# Patient Record
Sex: FEMALE
[De-identification: names, ages, dates, MRNs, and addresses within clinical notes are randomized per-mention and may not be internally consistent; named-entity substitution may affect disease eponyms.]

---

## 2017-01-16 PROBLEM — M17.11 PRIMARY OSTEOARTHRITIS OF RIGHT KNEE: Status: ACTIVE | Noted: 2017-01-16

## 2017-01-27 ENCOUNTER — CHARTING TRANS (OUTPATIENT)
Dept: OTHER | Age: 57
End: 2017-01-27

## 2017-01-27 ASSESSMENT — PAIN SCALES - GENERAL: PAINLEVEL_OUTOF10: 8

## 2017-02-14 PROCEDURE — 87086 URINE CULTURE/COLONY COUNT: CPT | Performed by: FAMILY MEDICINE

## 2017-02-14 PROCEDURE — 87077 CULTURE AEROBIC IDENTIFY: CPT | Performed by: FAMILY MEDICINE

## 2017-03-11 PROCEDURE — 80061 LIPID PANEL: CPT | Performed by: INTERNAL MEDICINE

## 2017-03-11 PROCEDURE — 84460 ALANINE AMINO (ALT) (SGPT): CPT | Performed by: INTERNAL MEDICINE

## 2017-03-11 PROCEDURE — 84450 TRANSFERASE (AST) (SGOT): CPT | Performed by: INTERNAL MEDICINE

## 2017-03-11 PROCEDURE — 87086 URINE CULTURE/COLONY COUNT: CPT | Performed by: FAMILY MEDICINE

## 2017-03-11 PROCEDURE — 36415 COLL VENOUS BLD VENIPUNCTURE: CPT | Performed by: INTERNAL MEDICINE

## 2017-04-04 PROBLEM — J30.1 SEASONAL ALLERGIC RHINITIS DUE TO POLLEN: Status: ACTIVE | Noted: 2017-04-04

## 2017-04-04 PROBLEM — E78.5 DYSLIPIDEMIA: Status: ACTIVE | Noted: 2017-04-04

## 2018-03-13 PROBLEM — Z87.19 HISTORY OF INGUINAL HERNIA: Status: ACTIVE | Noted: 2018-03-13

## 2018-03-13 PROBLEM — Z90.710 H/O ABDOMINAL HYSTERECTOMY: Status: ACTIVE | Noted: 2018-03-13

## 2018-04-23 ENCOUNTER — HOSPITAL ENCOUNTER (OUTPATIENT)
Dept: ULTRASOUND IMAGING | Age: 58
Discharge: HOME | End: 2018-04-23
Attending: PHYSICIAN ASSISTANT
Payer: COMMERCIAL

## 2018-04-23 DIAGNOSIS — R00.2 PALPITATIONS: ICD-10-CM

## 2018-04-23 DIAGNOSIS — E78.5 HYPERLIPEMIA: ICD-10-CM

## 2018-04-23 DIAGNOSIS — I25.10 CORONARY ATHEROSCLEROSIS OF NATIVE CORONARY ARTERY: ICD-10-CM

## 2018-04-23 PROCEDURE — 93880 EXTRACRANIAL BILAT STUDY: CPT | Performed by: PHYSICIAN ASSISTANT

## 2018-10-29 PROBLEM — M70.62 TROCHANTERIC BURSITIS OF BOTH HIPS: Status: ACTIVE | Noted: 2018-10-29

## 2018-10-29 PROBLEM — M70.61 TROCHANTERIC BURSITIS OF BOTH HIPS: Status: ACTIVE | Noted: 2018-10-29

## 2018-11-05 VITALS — TEMPERATURE: 98.6 F | HEART RATE: 78 BPM | WEIGHT: 219.25 LBS | RESPIRATION RATE: 20 BRPM

## 2019-03-06 PROCEDURE — 87086 URINE CULTURE/COLONY COUNT: CPT | Performed by: FAMILY MEDICINE

## 2019-03-06 PROCEDURE — 87147 CULTURE TYPE IMMUNOLOGIC: CPT | Performed by: FAMILY MEDICINE

## 2019-03-06 PROCEDURE — 81001 URINALYSIS AUTO W/SCOPE: CPT | Performed by: FAMILY MEDICINE

## 2019-03-12 PROBLEM — M85.80 OSTEOPENIA: Status: ACTIVE | Noted: 2019-01-01

## 2019-06-18 PROBLEM — G89.29 CHRONIC PAIN OF LEFT KNEE: Status: ACTIVE | Noted: 2019-06-18

## 2019-06-18 PROBLEM — M25.662 DECREASED RANGE OF MOTION (ROM) OF LEFT KNEE: Status: ACTIVE | Noted: 2019-06-18

## 2019-06-18 PROBLEM — M25.562 CHRONIC PAIN OF LEFT KNEE: Status: ACTIVE | Noted: 2019-06-18

## 2019-08-14 ENCOUNTER — LAB ENCOUNTER (OUTPATIENT)
Dept: LAB | Facility: HOSPITAL | Age: 59
End: 2019-08-14
Attending: ORTHOPAEDIC SURGERY
Payer: COMMERCIAL

## 2019-08-14 DIAGNOSIS — E83.52 SERUM CALCIUM ELEVATED: ICD-10-CM

## 2019-08-14 DIAGNOSIS — M17.12 PRIMARY OSTEOARTHRITIS OF LEFT KNEE: ICD-10-CM

## 2019-08-14 LAB
ANTIBODY SCREEN: NEGATIVE
CALCIUM BLD-MCNC: 9.3 MG/DL (ref 8.5–10.1)
PTH-INTACT SERPL-MCNC: 61.5 PG/ML (ref 18.5–88)
RH BLOOD TYPE: POSITIVE

## 2019-08-14 PROCEDURE — 87081 CULTURE SCREEN ONLY: CPT

## 2019-08-14 PROCEDURE — 86901 BLOOD TYPING SEROLOGIC RH(D): CPT

## 2019-08-14 PROCEDURE — 36415 COLL VENOUS BLD VENIPUNCTURE: CPT

## 2019-08-14 PROCEDURE — 86850 RBC ANTIBODY SCREEN: CPT

## 2019-08-14 PROCEDURE — 86900 BLOOD TYPING SEROLOGIC ABO: CPT

## 2019-08-14 PROCEDURE — 83970 ASSAY OF PARATHORMONE: CPT

## 2019-08-14 PROCEDURE — 82310 ASSAY OF CALCIUM: CPT

## 2019-08-27 ENCOUNTER — ANESTHESIA EVENT (OUTPATIENT)
Dept: SURGERY | Facility: HOSPITAL | Age: 59
DRG: 470 | End: 2019-08-27
Payer: COMMERCIAL

## 2019-08-28 ENCOUNTER — ANESTHESIA (OUTPATIENT)
Dept: SURGERY | Facility: HOSPITAL | Age: 59
DRG: 470 | End: 2019-08-28
Payer: COMMERCIAL

## 2019-08-28 ENCOUNTER — APPOINTMENT (OUTPATIENT)
Dept: GENERAL RADIOLOGY | Facility: HOSPITAL | Age: 59
DRG: 470 | End: 2019-08-28
Attending: ORTHOPAEDIC SURGERY
Payer: COMMERCIAL

## 2019-08-28 ENCOUNTER — HOSPITAL ENCOUNTER (INPATIENT)
Facility: HOSPITAL | Age: 59
LOS: 2 days | Discharge: HOME HEALTH | DRG: 470 | End: 2019-08-30
Attending: ORTHOPAEDIC SURGERY | Admitting: ORTHOPAEDIC SURGERY
Payer: COMMERCIAL

## 2019-08-28 DIAGNOSIS — M17.12 PRIMARY OSTEOARTHRITIS OF LEFT KNEE: Primary | ICD-10-CM

## 2019-08-28 PROCEDURE — 0SRD0J9 REPLACEMENT OF LEFT KNEE JOINT WITH SYNTHETIC SUBSTITUTE, CEMENTED, OPEN APPROACH: ICD-10-PCS | Performed by: ORTHOPAEDIC SURGERY

## 2019-08-28 PROCEDURE — 88311 DECALCIFY TISSUE: CPT | Performed by: ORTHOPAEDIC SURGERY

## 2019-08-28 PROCEDURE — 97161 PT EVAL LOW COMPLEX 20 MIN: CPT

## 2019-08-28 PROCEDURE — 97530 THERAPEUTIC ACTIVITIES: CPT

## 2019-08-28 PROCEDURE — 76942 ECHO GUIDE FOR BIOPSY: CPT | Performed by: ORTHOPAEDIC SURGERY

## 2019-08-28 PROCEDURE — 73560 X-RAY EXAM OF KNEE 1 OR 2: CPT | Performed by: ORTHOPAEDIC SURGERY

## 2019-08-28 PROCEDURE — 3E0T3BZ INTRODUCTION OF ANESTHETIC AGENT INTO PERIPHERAL NERVES AND PLEXI, PERCUTANEOUS APPROACH: ICD-10-PCS | Performed by: ANESTHESIOLOGY

## 2019-08-28 PROCEDURE — 88305 TISSUE EXAM BY PATHOLOGIST: CPT | Performed by: ORTHOPAEDIC SURGERY

## 2019-08-28 DEVICE — ATTUNE KNEE SYSTEM TIBIAL INSERT ROTATING PLATFORM POSTERIOR STABILIZED 6 6MM AOX
Type: IMPLANTABLE DEVICE | Site: KNEE | Status: FUNCTIONAL
Brand: ATTUNE

## 2019-08-28 DEVICE — ATTUNE KNEE SYSTEM TIBIAL BASE ROTATING PLATFORM SIZE 4 CEMENTED
Type: IMPLANTABLE DEVICE | Site: KNEE | Status: FUNCTIONAL
Brand: ATTUNE

## 2019-08-28 DEVICE — ATTUNE PATELLA MEDIALIZED ANATOMIC 35MM CEMENTED AOX
Type: IMPLANTABLE DEVICE | Site: KNEE | Status: FUNCTIONAL
Brand: ATTUNE

## 2019-08-28 DEVICE — ATTUNE KNEE SYSTEM FEMORAL POSTERIOR STABILIZED NARROW SIZE 6N LEFT CEMENTED
Type: IMPLANTABLE DEVICE | Site: KNEE | Status: FUNCTIONAL
Brand: ATTUNE

## 2019-08-28 DEVICE — CEMENT BONE RADIOPAQ HOWMEDICA: Type: IMPLANTABLE DEVICE | Site: KNEE | Status: FUNCTIONAL

## 2019-08-28 RX ORDER — ONDANSETRON 2 MG/ML
4 INJECTION INTRAMUSCULAR; INTRAVENOUS EVERY 4 HOURS PRN
Status: DISPENSED | OUTPATIENT
Start: 2019-08-28 | End: 2019-08-30

## 2019-08-28 RX ORDER — HYDROMORPHONE HYDROCHLORIDE 1 MG/ML
0.8 INJECTION, SOLUTION INTRAMUSCULAR; INTRAVENOUS; SUBCUTANEOUS EVERY 2 HOUR PRN
Status: ACTIVE | OUTPATIENT
Start: 2019-08-28 | End: 2019-08-30

## 2019-08-28 RX ORDER — NALOXONE HYDROCHLORIDE 0.4 MG/ML
80 INJECTION, SOLUTION INTRAMUSCULAR; INTRAVENOUS; SUBCUTANEOUS AS NEEDED
Status: DISCONTINUED | OUTPATIENT
Start: 2019-08-28 | End: 2019-08-28 | Stop reason: HOSPADM

## 2019-08-28 RX ORDER — SODIUM CHLORIDE, SODIUM LACTATE, POTASSIUM CHLORIDE, CALCIUM CHLORIDE 600; 310; 30; 20 MG/100ML; MG/100ML; MG/100ML; MG/100ML
INJECTION, SOLUTION INTRAVENOUS CONTINUOUS
Status: DISCONTINUED | OUTPATIENT
Start: 2019-08-28 | End: 2019-08-30

## 2019-08-28 RX ORDER — SODIUM PHOSPHATE, DIBASIC AND SODIUM PHOSPHATE, MONOBASIC 7; 19 G/133ML; G/133ML
1 ENEMA RECTAL ONCE AS NEEDED
Status: DISCONTINUED | OUTPATIENT
Start: 2019-08-28 | End: 2019-08-30

## 2019-08-28 RX ORDER — METOCLOPRAMIDE HYDROCHLORIDE 5 MG/ML
10 INJECTION INTRAMUSCULAR; INTRAVENOUS AS NEEDED
Status: DISCONTINUED | OUTPATIENT
Start: 2019-08-28 | End: 2019-08-28 | Stop reason: HOSPADM

## 2019-08-28 RX ORDER — HYDROCODONE BITARTRATE AND ACETAMINOPHEN 10; 325 MG/1; MG/1
1 TABLET ORAL AS NEEDED
Status: DISCONTINUED | OUTPATIENT
Start: 2019-08-28 | End: 2019-08-28 | Stop reason: HOSPADM

## 2019-08-28 RX ORDER — METOCLOPRAMIDE HYDROCHLORIDE 5 MG/ML
10 INJECTION INTRAMUSCULAR; INTRAVENOUS EVERY 6 HOURS PRN
Status: DISPENSED | OUTPATIENT
Start: 2019-08-28 | End: 2019-08-30

## 2019-08-28 RX ORDER — MAGNESIUM OXIDE 400 MG (241.3 MG MAGNESIUM) TABLET
3 TABLET NIGHTLY
Status: DISCONTINUED | OUTPATIENT
Start: 2019-08-28 | End: 2019-08-30

## 2019-08-28 RX ORDER — ACETAMINOPHEN 325 MG/1
650 TABLET ORAL EVERY 6 HOURS PRN
Status: DISCONTINUED | OUTPATIENT
Start: 2019-08-28 | End: 2019-08-28 | Stop reason: HOSPADM

## 2019-08-28 RX ORDER — DIPHENHYDRAMINE HYDROCHLORIDE 50 MG/ML
25 INJECTION INTRAMUSCULAR; INTRAVENOUS ONCE AS NEEDED
Status: ACTIVE | OUTPATIENT
Start: 2019-08-28 | End: 2019-08-28

## 2019-08-28 RX ORDER — LISINOPRIL 20 MG/1
20 TABLET ORAL DAILY
Status: DISCONTINUED | OUTPATIENT
Start: 2019-08-29 | End: 2019-08-30

## 2019-08-28 RX ORDER — ATORVASTATIN CALCIUM 20 MG/1
20 TABLET, FILM COATED ORAL NIGHTLY
COMMUNITY
End: 2020-01-07

## 2019-08-28 RX ORDER — OXYCODONE HCL 10 MG/1
10 TABLET, FILM COATED, EXTENDED RELEASE ORAL
Status: COMPLETED | OUTPATIENT
Start: 2019-08-28 | End: 2019-08-28

## 2019-08-28 RX ORDER — LISINOPRIL 20 MG/1
20 TABLET ORAL DAILY
COMMUNITY
End: 2020-01-27

## 2019-08-28 RX ORDER — HYDROCHLOROTHIAZIDE 25 MG/1
25 TABLET ORAL DAILY
COMMUNITY
End: 2020-01-27

## 2019-08-28 RX ORDER — ONDANSETRON 2 MG/ML
INJECTION INTRAMUSCULAR; INTRAVENOUS
Status: DISPENSED
Start: 2019-08-28 | End: 2019-08-29

## 2019-08-28 RX ORDER — DOCUSATE SODIUM 100 MG/1
100 CAPSULE, LIQUID FILLED ORAL 2 TIMES DAILY
Status: DISCONTINUED | OUTPATIENT
Start: 2019-08-28 | End: 2019-08-30

## 2019-08-28 RX ORDER — ONDANSETRON 2 MG/ML
4 INJECTION INTRAMUSCULAR; INTRAVENOUS AS NEEDED
Status: DISCONTINUED | OUTPATIENT
Start: 2019-08-28 | End: 2019-08-28 | Stop reason: HOSPADM

## 2019-08-28 RX ORDER — MELATONIN
325
Status: DISCONTINUED | OUTPATIENT
Start: 2019-08-29 | End: 2019-08-30

## 2019-08-28 RX ORDER — BISACODYL 10 MG
10 SUPPOSITORY, RECTAL RECTAL
Status: DISCONTINUED | OUTPATIENT
Start: 2019-08-28 | End: 2019-08-30

## 2019-08-28 RX ORDER — MEPERIDINE HYDROCHLORIDE 25 MG/ML
12.5 INJECTION INTRAMUSCULAR; INTRAVENOUS; SUBCUTANEOUS AS NEEDED
Status: COMPLETED | OUTPATIENT
Start: 2019-08-28 | End: 2019-08-28

## 2019-08-28 RX ORDER — SENNOSIDES 8.6 MG
17.2 TABLET ORAL NIGHTLY
Status: DISCONTINUED | OUTPATIENT
Start: 2019-08-28 | End: 2019-08-30

## 2019-08-28 RX ORDER — ACETAMINOPHEN 325 MG/1
TABLET ORAL
Status: COMPLETED
Start: 2019-08-28 | End: 2019-08-28

## 2019-08-28 RX ORDER — SODIUM CHLORIDE 9 MG/ML
INJECTION, SOLUTION INTRAVENOUS CONTINUOUS
Status: DISCONTINUED | OUTPATIENT
Start: 2019-08-28 | End: 2019-08-30

## 2019-08-28 RX ORDER — HYDROCODONE BITARTRATE AND ACETAMINOPHEN 10; 325 MG/1; MG/1
2 TABLET ORAL AS NEEDED
Status: DISCONTINUED | OUTPATIENT
Start: 2019-08-28 | End: 2019-08-28 | Stop reason: HOSPADM

## 2019-08-28 RX ORDER — MEPERIDINE HYDROCHLORIDE 25 MG/ML
INJECTION INTRAMUSCULAR; INTRAVENOUS; SUBCUTANEOUS
Status: COMPLETED
Start: 2019-08-28 | End: 2019-08-28

## 2019-08-28 RX ORDER — HYDROMORPHONE HYDROCHLORIDE 1 MG/ML
0.4 INJECTION, SOLUTION INTRAMUSCULAR; INTRAVENOUS; SUBCUTANEOUS EVERY 5 MIN PRN
Status: DISCONTINUED | OUTPATIENT
Start: 2019-08-28 | End: 2019-08-28 | Stop reason: HOSPADM

## 2019-08-28 RX ORDER — OXYCODONE HYDROCHLORIDE 5 MG/1
5 TABLET ORAL EVERY 4 HOURS PRN
Status: DISPENSED | OUTPATIENT
Start: 2019-08-28 | End: 2019-08-30

## 2019-08-28 RX ORDER — TIZANIDINE 4 MG/1
4 TABLET ORAL 3 TIMES DAILY PRN
Status: DISCONTINUED | OUTPATIENT
Start: 2019-08-28 | End: 2019-08-30

## 2019-08-28 RX ORDER — POLYETHYLENE GLYCOL 3350 17 G/17G
17 POWDER, FOR SOLUTION ORAL DAILY PRN
Status: DISCONTINUED | OUTPATIENT
Start: 2019-08-28 | End: 2019-08-30

## 2019-08-28 RX ORDER — SCOLOPAMINE TRANSDERMAL SYSTEM 1 MG/1
1 PATCH, EXTENDED RELEASE TRANSDERMAL ONCE
Status: DISCONTINUED | OUTPATIENT
Start: 2019-08-28 | End: 2019-08-30

## 2019-08-28 RX ORDER — ACETAMINOPHEN 325 MG/1
650 TABLET ORAL 4 TIMES DAILY
Status: DISPENSED | OUTPATIENT
Start: 2019-08-28 | End: 2019-08-30

## 2019-08-28 RX ORDER — HYDROMORPHONE HYDROCHLORIDE 1 MG/ML
0.4 INJECTION, SOLUTION INTRAMUSCULAR; INTRAVENOUS; SUBCUTANEOUS EVERY 2 HOUR PRN
Status: DISPENSED | OUTPATIENT
Start: 2019-08-28 | End: 2019-08-30

## 2019-08-28 RX ORDER — MIDAZOLAM HYDROCHLORIDE 1 MG/ML
1 INJECTION INTRAMUSCULAR; INTRAVENOUS EVERY 5 MIN PRN
Status: DISCONTINUED | OUTPATIENT
Start: 2019-08-28 | End: 2019-08-28 | Stop reason: HOSPADM

## 2019-08-28 RX ORDER — OXYCODONE HYDROCHLORIDE 15 MG/1
15 TABLET ORAL EVERY 4 HOURS PRN
Status: ACTIVE | OUTPATIENT
Start: 2019-08-28 | End: 2019-08-30

## 2019-08-28 RX ORDER — PROCHLORPERAZINE EDISYLATE 5 MG/ML
10 INJECTION INTRAMUSCULAR; INTRAVENOUS EVERY 6 HOURS PRN
Status: ACTIVE | OUTPATIENT
Start: 2019-08-28 | End: 2019-08-30

## 2019-08-28 RX ORDER — DIPHENHYDRAMINE HYDROCHLORIDE 50 MG/ML
12.5 INJECTION INTRAMUSCULAR; INTRAVENOUS EVERY 4 HOURS PRN
Status: DISCONTINUED | OUTPATIENT
Start: 2019-08-28 | End: 2019-08-30

## 2019-08-28 RX ORDER — PANTOPRAZOLE SODIUM 40 MG/1
40 TABLET, DELAYED RELEASE ORAL
Status: DISCONTINUED | OUTPATIENT
Start: 2019-08-29 | End: 2019-08-30

## 2019-08-28 RX ORDER — HYDROMORPHONE HYDROCHLORIDE 1 MG/ML
0.2 INJECTION, SOLUTION INTRAMUSCULAR; INTRAVENOUS; SUBCUTANEOUS EVERY 2 HOUR PRN
Status: DISPENSED | OUTPATIENT
Start: 2019-08-28 | End: 2019-08-30

## 2019-08-28 RX ORDER — SODIUM CHLORIDE, SODIUM LACTATE, POTASSIUM CHLORIDE, CALCIUM CHLORIDE 600; 310; 30; 20 MG/100ML; MG/100ML; MG/100ML; MG/100ML
INJECTION, SOLUTION INTRAVENOUS CONTINUOUS
Status: DISCONTINUED | OUTPATIENT
Start: 2019-08-28 | End: 2019-08-28 | Stop reason: HOSPADM

## 2019-08-28 RX ORDER — ACETAMINOPHEN 500 MG
1000 TABLET ORAL ONCE
Status: DISCONTINUED | OUTPATIENT
Start: 2019-08-28 | End: 2019-08-28 | Stop reason: HOSPADM

## 2019-08-28 RX ORDER — HYDROMORPHONE HYDROCHLORIDE 1 MG/ML
INJECTION, SOLUTION INTRAMUSCULAR; INTRAVENOUS; SUBCUTANEOUS
Status: COMPLETED
Start: 2019-08-28 | End: 2019-08-28

## 2019-08-28 RX ORDER — DIPHENHYDRAMINE HCL 25 MG
25 CAPSULE ORAL EVERY 4 HOURS PRN
Status: DISCONTINUED | OUTPATIENT
Start: 2019-08-28 | End: 2019-08-30

## 2019-08-28 RX ORDER — OXYCODONE HYDROCHLORIDE 10 MG/1
10 TABLET ORAL EVERY 4 HOURS PRN
Status: DISPENSED | OUTPATIENT
Start: 2019-08-28 | End: 2019-08-30

## 2019-08-28 RX ORDER — ATORVASTATIN CALCIUM 20 MG/1
20 TABLET, FILM COATED ORAL NIGHTLY
Status: DISCONTINUED | OUTPATIENT
Start: 2019-08-28 | End: 2019-08-30

## 2019-08-28 NOTE — PHYSICAL THERAPY NOTE
PHYSICAL THERAPY KNEE EVALUATION - INPATIENT     Room Number: 355/355-A  Evaluation Date: 8/28/2019  Type of Evaluation: Initial  Physician Order: PT Eval and Treat    Presenting Problem: S/p Left TKA on 08/28/19  Reason for Therapy: Mobility Dysfunction a main level  Stairs to Enter : 1  Railing: No  Stairs to Bedroom: 0  Railing: No    Lives With: Spouse  Drives: Yes  Patient Owned Equipment: Rolling walker;Cane  Patient Regularly Uses: Glasses    Prior Level of [place]: Patient was independent with A 2      AM-PAC '6-Clicks' INPATIENT SHORT FORM - BASIC MOBILITY  How much difficulty does the patient currently have. ..  -   Turning over in bed (including adjusting bedclothes, sheets and blankets)?: None   -   Sitting down on and standing up from a chair bed;With  staff;Needs met;Call light within reach;RN aware of session/findings; All patient questions and concerns addressed;SCDs in place; Ice applied; Family present; Discussed recommendations with /    ASSESSMENT   Patient is a 60 #2    Patient is able to demonstrate transfers Sit to/from Stand at assistance level: supervison    Goal #3    Patient is able to ambulate 150 feet with assistive device at assistance level: modified  independent    Goal #4    Patient will negotiate 4 stai

## 2019-08-28 NOTE — ANESTHESIA PREPROCEDURE EVALUATION
PRE-OP EVALUATION    Patient Name: Sundar Aburto    Pre-op Diagnosis: Primary osteoarthritis of left knee [M17.12]    Procedure(s):  LEFT TOTAL KNEE REPLACEMENT    Surgeon(s) and Role:     Irwin Waite MD - Primary    Pre-op vitals reviewed.         B summary reviewed.     Anesthetic Complications  (+) history of anesthetic complications  History of: PONV       GI/Hepatic/Renal      (+) GERD                (+) diverticulitis           Cardiovascular                  (+) hypertension   (+) hyperlipidemia 4.04 08/05/2019     08/05/2019    CO2 26.1 08/05/2019    BUN 24.0 (H) 08/05/2019    CREATSERUM 0.79 08/05/2019     08/05/2019    CA 9.3 08/14/2019    CA 10.1 (H) 08/05/2019            Airway      Mallampati: II  Mouth opening: 3 FB  TM distanc

## 2019-08-28 NOTE — ANESTHESIA POSTPROCEDURE EVALUATION
[address] [facility] Patient Status:  Surgery Admit - Inpt   Age/Gender 60year old female MRN [id number]   Location [address] [facility] Attending Brandy Wheeler MD   Hosp Day # 0 PCP Neyda Yanes MD       Anesthesia

## 2019-08-28 NOTE — CONSULTS
[facility] Hospitalist Initial Consult       Reason for consult: Medical Management sp TKA      History of Present Illness: Patient is a 60year old female with PMH sig for CAD, HTN, anxiety,  who presents sp TKA.    They tolerated the procedure well without any i Use      Smoking status: Former Smoker        Packs/day: 0.10        Years: 3.00        Pack years: .3        Types: Cigarettes        Start date: 2013        Quit date: 2015        Years since quittin.3      Smokeless tobacco: Never Used Laboratory:    ASSESSMENT / PLAN:    sp TKA  - Plan per ortho. Continue PT/OT. Pain is currently controlled. Eliquis for DVT prophylaxis.      Acute on chronic pain  - cont IV narctotics as needed and monitor for signs of complication    HTN  - cont

## 2019-08-28 NOTE — PLAN OF CARE
Im md paged regarding consult. Pt A&Ox4. On ra , 2 l o2 prn via nc.  & scds in place. Ace wrap to knee clean dry and intact, gel ice in place. Pain controlled on pain meds. Ivf infusing. Pt dtv.  Pt verbalized understanding of poc & call dont fall prot

## 2019-08-28 NOTE — PROGRESS NOTES
Juliano Thomas   6/28/2019 9:00 AM   Office Visit   MRN:  [id number]   Description: 60year old female Provider: Chang Rothman MD Department: [facility]   Scanning Cover Sheet     Click to print Delaney Circe 852 for scanning   Office hydrochlorothiazide 25 MG Oral Tab Take 1 tablet (25 mg total) by mouth daily. Disp: 90 tablet Rfl: 3   lisinopril 20 MG Oral Tab Take 1 tablet (20 mg total) by mouth daily. Disp: 90 tablet Rfl: 3   Melatonin 10 MG Oral Tab Take 5 mg by mouth.    Disp:  Rfl Packs/day: 0.10        Years: 3.00        Pack years: .3        Types: Cigarettes        Start date: 2013        Quit date: 2015        Years since quittin.1      Smokeless tobacco: Never Used      Tobacco comment: 2-3 cig per week.  hus Patient’s diagnosis and treatment options were discussed. Conservative care vs surgical intervention including joint replacement options were discussed. Pros and cons of these options were explained. Total knee replacement was further discussed.    Hospi

## 2019-08-28 NOTE — OPERATIVE REPORT
[address] TOTAL KNEE OPERATIVE REPORT:  DATE OF SURGERY: 8/28/2019    Roxana Wang       [id number]     11/10/1960    PREOP DX: LEFT  KNEE PRIMARY OSTEOARTHRITIS  POSTOP DX:LEFT KNEE PRIMARY OSTEOARTHRITIS  PROCEDURE: LEFT TOT SPACER. ATTENTION WAS TURNED BACK TO DISTAL FEMUR. KNEE WAS FLEXED. FEMUR WAS SIZED AT 6. ANTERIOR-POSTERIOR CUT WAS MADE AT 0 DEG IN LINE WITH POSTERIOR FEMORAL CONDYLES. IT WAS A 9 mm CUT FROM BOTH POSTERIOR CONDYLES.    FLEXION AND EXTENSION GAPS

## 2019-08-28 NOTE — H&P
Kilo Thomas   8/5/2019 8:30 AM   Office Visit   MRN:  [id number]   Description: 60year old female Provider: Carroll Richards MD [facility]     Click to print Barcode Encounter Cover Sheet for scanning • High blood pressure     • High cholesterol     • IBS (irritable bowel syndrome)     • Obstructive apnea 07/21/2018     DMG PSG AHI 7 supine AHI 7 non-supine AHI 0.7   • Osteoarthritis     • Osteopenia 2019   • PONV (postoperative nausea and vomiting)   : denies dysuria, vaginal discharge   MUSCULOSKELETAL: denies back pain  NEURO: denies headaches  PSYCHE: denies depression or anxiety  HEMATOLOGIC: denies hx of anemia  ENDOCRINE: denies thyroid history  ALL/ASTHMA: denies hx of asthma     EXAM:   BP 11 Patient states cardiologist, Dr. Krista Garcia.   Advised patient to contact her cardiologist for cardiovascular clearance     Osteopenia, unspecified location     Prediabetes  Treated with low-carb diet     Dyslipidemia  Treated with atorvastatin     Chronic pain The above referenced H&P was reviewed by aFbiola Washburn MD on 8/28/2019, the patient was examined and no significant changes have occurred in the patient's condition since the H&P was performed.  I discussed with the patient and/or legal representative the po

## 2019-08-29 LAB
DEPRECATED RDW RBC AUTO: 41.1 FL (ref 35.1–46.3)
ERYTHROCYTE [DISTWIDTH] IN BLOOD BY AUTOMATED COUNT: 12.7 % (ref 11–15)
HCT VFR BLD AUTO: 35.1 % (ref 35–48)
HGB BLD-MCNC: 11.7 G/DL (ref 12–16)
MCH RBC QN AUTO: 29.2 PG (ref 26–34)
MCHC RBC AUTO-ENTMCNC: 33.3 G/DL (ref 31–37)
MCV RBC AUTO: 87.5 FL (ref 80–100)
PLATELET # BLD AUTO: 160 10(3)UL (ref 150–450)
RBC # BLD AUTO: 4.01 X10(6)UL (ref 3.8–5.3)
WBC # BLD AUTO: 7.2 X10(3) UL (ref 4–11)

## 2019-08-29 PROCEDURE — 97116 GAIT TRAINING THERAPY: CPT

## 2019-08-29 PROCEDURE — 97165 OT EVAL LOW COMPLEX 30 MIN: CPT

## 2019-08-29 PROCEDURE — 97150 GROUP THERAPEUTIC PROCEDURES: CPT

## 2019-08-29 PROCEDURE — 97535 SELF CARE MNGMENT TRAINING: CPT

## 2019-08-29 PROCEDURE — 85027 COMPLETE CBC AUTOMATED: CPT | Performed by: ORTHOPAEDIC SURGERY

## 2019-08-29 RX ORDER — OXYCODONE HYDROCHLORIDE 5 MG/1
5 TABLET ORAL EVERY 6 HOURS PRN
Qty: 60 TABLET | Refills: 0 | Status: SHIPPED | OUTPATIENT
Start: 2019-08-29 | End: 2019-10-15

## 2019-08-29 RX ORDER — CELECOXIB 200 MG/1
200 CAPSULE ORAL 2 TIMES DAILY
Qty: 60 CAPSULE | Refills: 0 | Status: SHIPPED | OUTPATIENT
Start: 2019-08-29 | End: 2020-03-10

## 2019-08-29 RX ORDER — CELECOXIB 100 MG/1
100 CAPSULE ORAL 2 TIMES DAILY
Status: COMPLETED | OUTPATIENT
Start: 2019-08-29 | End: 2019-08-30

## 2019-08-29 RX ORDER — HYDROCHLOROTHIAZIDE 25 MG/1
25 TABLET ORAL DAILY
Status: DISCONTINUED | OUTPATIENT
Start: 2019-08-29 | End: 2019-08-30

## 2019-08-29 RX ORDER — PSEUDOEPHEDRINE HCL 30 MG
100 TABLET ORAL 2 TIMES DAILY
Qty: 60 CAPSULE | Refills: 0 | Status: SHIPPED | OUTPATIENT
Start: 2019-08-29 | End: 2019-10-15

## 2019-08-29 NOTE — PLAN OF CARE
Pain well controlled with PO prn pain meds. Tolerated first dose of Oxycodone well. Ace wrap is C/D/I. Gel ice therapy in place. Voiding without difficulty. VSS. Up with min assist and walker. Updated on POC.

## 2019-08-29 NOTE — PROGRESS NOTES
Post Op Day 1 Ortho Note    Status Post Nerve Block:  Type of Nerve Block: Left adductor canal TKR  Single Injection Nerve Block    Post op review: No evidence of immediate block related complications, No paresthesia noted, Able to lift leg(s), Able to adam

## 2019-08-29 NOTE — CM/SW NOTE
59 yo sp total knee replacement. PT is recommending home physical therapy. [facility]. HOME SITUATION  Type of Home: House   Home Layout: Two level; Able to live on main level  Stairs to Enter : 1  Railing: No  Stair

## 2019-08-29 NOTE — OCCUPATIONAL THERAPY NOTE
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 : [employer]  -   Putting on and taking off regular upper body clothing?: None  -   Taking care of personal grooming such as brushing teeth?: None  -   Eating meals?: None    AM-PAC Score:  Score: 21  Approx Deg ability to participate in ADLs, instrumental activities of daily living, rest and sleep, work, leisure and social participation.      The patient is functioning below her previous functional level and would benefit from skilled inpatient OT to address the a

## 2019-08-29 NOTE — PHYSICAL THERAPY NOTE
PHYSICAL THERAPY KNEE TREATMENT NOTE - INPATIENT     Room Number: 355/355-A     Session: 1&2   Number of Visits to Meet Established Goals: 2    Presenting Problem: S/p Left TKA on 08/28/19    Problem List  Active Problems:    * No active hospital problems extremity           L Lower Extremity: Weight Bearing as Tolerated    PAIN ASSESSMENT   Ratin  Location: Left Knee @ surgical site  Management Techniques: Activity promotion; Body mechanics;Breathing techniques;Relaxation;Repositioning    BALANCE  Stati Pt shows improved tolerance to increased repetitions of exercises with some assist and cues for technique. Pt performed gait training, ambulated with  feet with sup assist. Pt needs cues to correct proper gait pattern during mobility.   Pt was trained 150 feet with assistive device at assistance level: modified  independent    Goal #4     Patient will negotiate 4 stairs/one curb w/ assistive device and supervision    Goal #5     AROM 0 degrees extension to 75 degrees flexion      Goal #6           Goal

## 2019-08-29 NOTE — HOME CARE LIAISON
MET WITH PTNT AND OFFERED CHOICE  OF AGENCIES. PTNT AGREEABLE TO [facility]. MET WITH PTNT TO DISCUSS HOME HEALTH SERVICES AND COVERAGE CRITERIA. PTNT AGREEABLE TO [facility]. PTNT GIVEN RESIDENTIAL BROCHURE.  RESIDENTIAL WITH PROVIDE SN/PT ON DISC

## 2019-08-29 NOTE — PROGRESS NOTES
[facility] Hospitalist Progress Note                                                                   [address]  11/10/1960    CC: FU post op    Interval History:  - Doing well, having rafael 29.2   MCHC 33.3   RDW 12.7   WBC 7.2   .0       No results for input(s): GLU, BUN, CREATSERUM, GFRAA, GFRNAA, CA, NA, K, CL, CO2 in the last 168 hours.         ROS: no change to ROS from my documentation yesterday, except as otherwise noted in the I

## 2019-08-29 NOTE — PROGRESS NOTES
Orthopedic surgery progress note    Luis Alfredo Thomas Patient Status:  Inpatient    11/10/1960 MRN [id number]   [facility] 3SW-A Attending Oumar Montero MD   Hosp Day # 1 PCP Leann Naranjo MD       Subjective:  Co left thigh pain.   No ca

## 2019-08-29 NOTE — PROGRESS NOTES
Patient desats to low 80's on room air when drowsy and with medications. Saturation rebounds to 90s when speaking and engaged. 2L O2 per NC applied, encouraged IS use. Will use pain medications cautiously to avoid desaturation and wean O2 as tolerated.

## 2019-08-30 VITALS
RESPIRATION RATE: 16 BRPM | WEIGHT: 211.63 LBS | SYSTOLIC BLOOD PRESSURE: 134 MMHG | DIASTOLIC BLOOD PRESSURE: 55 MMHG | HEART RATE: 79 BPM | HEIGHT: 64 IN | TEMPERATURE: 99 F | BODY MASS INDEX: 36.13 KG/M2 | OXYGEN SATURATION: 98 %

## 2019-08-30 LAB
DEPRECATED RDW RBC AUTO: 41.4 FL (ref 35.1–46.3)
ERYTHROCYTE [DISTWIDTH] IN BLOOD BY AUTOMATED COUNT: 12.8 % (ref 11–15)
HCT VFR BLD AUTO: 33.6 % (ref 35–48)
HGB BLD-MCNC: 11.2 G/DL (ref 12–16)
MCH RBC QN AUTO: 29.6 PG (ref 26–34)
MCHC RBC AUTO-ENTMCNC: 33.3 G/DL (ref 31–37)
MCV RBC AUTO: 88.7 FL (ref 80–100)
PLATELET # BLD AUTO: 170 10(3)UL (ref 150–450)
RBC # BLD AUTO: 3.79 X10(6)UL (ref 3.8–5.3)
WBC # BLD AUTO: 7.4 X10(3) UL (ref 4–11)

## 2019-08-30 PROCEDURE — 97535 SELF CARE MNGMENT TRAINING: CPT

## 2019-08-30 PROCEDURE — 97150 GROUP THERAPEUTIC PROCEDURES: CPT

## 2019-08-30 PROCEDURE — 85027 COMPLETE CBC AUTOMATED: CPT | Performed by: ORTHOPAEDIC SURGERY

## 2019-08-30 PROCEDURE — 97530 THERAPEUTIC ACTIVITIES: CPT

## 2019-08-30 NOTE — PROGRESS NOTES
Post Op Day 2 Ortho Note: Left TKA    Assessed patient in chair. Rates pain 0/10 at rest and 5/10 with activity. States pain is managed with medications; denies itching/nausea/dizziness. Able to bear weight on sx leg; equal sensation in BLE.      No furt

## 2019-08-30 NOTE — PLAN OF CARE
Patient discharged to home this afternoon. Attended discharge class 8/29 with Cristobal Diego RN. Reviewed paperwork at bedside with patient and spouse, all questions answered at this time. Patient and spouse verbalized understanding of all teaching.    Scripts pro

## 2019-08-30 NOTE — CM/SW NOTE
08/30/19 1500   Discharge disposition   Expected discharge disposition Home-Health   Name of Aly Proc[facility] Almeida Mohamud 1 services after discharge Skilled home care   Discharge transportation Private car

## 2019-08-30 NOTE — PLAN OF CARE
Appears sleepy when received,awakens easily. V/S stable,pain well controlled. Denies calf pain or tenderness,voiding without problems. Assisted as needed. IS reinforced,foot and ankle pump exercises reinforced. Possible discharge in am,to home with home health,

## 2019-08-30 NOTE — PHYSICAL THERAPY NOTE
PHYSICAL THERAPY KNEE TREATMENT NOTE - INPATIENT     Room Number: 355/355-A     Session: 3   Number of Visits to Meet Established Goals: 2    Presenting Problem: S/p Left TKA on 08/28/19    Problem List  Active Problems:    * No active hospital problems. Restriction: L lower extremity           L Lower Extremity: Weight Bearing as Tolerated    PAIN ASSESSMENT   Ratin  Location: Left Knee @ surgical site  Management Techniques: Activity promotion; Body mechanics;Breathing techniques;Relaxation;Reposition transfer technique with min assist for R LE.      Exercises AM Session   Ankle Pumps 20 reps   Quad Sets 20 reps   Glut Sets 20 reps   Hip Abd/Add 20 reps   Heel slides 20 reps   Saq 20 reps   SLR 20 reps   Sitting Knee Flexion 20 reps   Standing heel/toe r is able to demonstrate supine - sit EOB @ level: supervision    Goal #2     Patient is able to demonstrate transfers Sit to/from Stand at assistance level: supervison  Met     Goal #3     Patient is able to ambulate 150 feet with assistive device at assist

## 2019-08-30 NOTE — OCCUPATIONAL THERAPY NOTE
OCCUPATIONAL THERAPY TREATMENT NOTE - INPATIENT     Room Number: 355/355-A  Session: 1/1  Number of Visits to Meet Established Goals: 1    Presenting Problem: (L TKA)    History related to current admission:  Admitted for elective Ashley Dense on 8/28.    [facility] Inclu home today    OBJECTIVE  Precautions: Cardiac    WEIGHT BEARING RESTRICTION  Weight Bearing Restriction: L lower extremity           L Lower Extremity: Weight Bearing as Tolerated    PAIN ASSESSMENT  Rating: Unable to rate  Location: (L knee)  Management T (Comment)(in PT gym)    ASSESSMENT   Patient presents with improved tolerance decreased pain. She is able to complete toileting with modified independence and can dress self with set-up and assist only for R shoe.   The AM-HILDA ' '6-Clicks' Inpatient Daily A

## 2019-08-30 NOTE — PROGRESS NOTES
Orthopedic surgery progress note    Leonie Thomas Patient Status:  Inpatient    11/10/1960 MRN [id number]   [facility] 3SW-A Attending Jonn Patel MD   Hosp Day # 2 PCP Annmarie Lopez MD       Subjective:  Left thigh pain is better.

## 2019-08-30 NOTE — PROGRESS NOTES
[facility] Hospitalist Progress Note                                                                   [address]  11/10/1960    CC: FU post op    Interval History:  - Doing well, pain impro

## 2019-09-08 NOTE — DISCHARGE SUMMARY
Discharge Summary  Patient ID:  Alxe Gil  [id number]  60year old  11/10/1960    Admit date: 8/28/2019    Discharge date and time: 8/30/19    Attending Physician: No att. providers found     Reason for admission: left knee primary OA    Discharge D daily., Normal, Disp-60 capsule, R-0      CONTINUE these medications which have NOT CHANGED    atorvastatin 20 MG Oral Tab  Take 20 mg by mouth nightly., Historical    hydrochlorothiazide 25 MG Oral Tab  Take 25 mg by mouth daily. , Historical    lisinopril

## 2020-08-03 PROBLEM — R06.09 DYSPNEA ON EXERTION: Status: ACTIVE | Noted: 2020-08-03

## 2020-08-03 PROBLEM — R00.0 TACHYCARDIA: Status: ACTIVE | Noted: 2020-08-03

## 2020-08-03 PROBLEM — I73.9 PVD (PERIPHERAL VASCULAR DISEASE) (HCC): Status: ACTIVE | Noted: 2020-08-03

## 2020-08-03 PROBLEM — E78.2 MIXED HYPERLIPIDEMIA: Status: ACTIVE | Noted: 2020-08-03

## 2020-08-03 PROBLEM — I73.9 PVD (PERIPHERAL VASCULAR DISEASE): Status: ACTIVE | Noted: 2020-08-03

## 2020-08-03 PROBLEM — R06.00 DYSPNEA ON EXERTION: Status: ACTIVE | Noted: 2020-08-03

## 2020-08-06 ENCOUNTER — HOSPITAL ENCOUNTER (OUTPATIENT)
Dept: CV DIAGNOSTICS | Facility: HOSPITAL | Age: 60
Discharge: HOME | End: 2020-08-06
Attending: NURSE PRACTITIONER
Payer: COMMERCIAL

## 2020-08-06 DIAGNOSIS — R00.0 TACHYCARDIA: ICD-10-CM

## 2020-08-06 DIAGNOSIS — R06.00 DYSPNEA ON EXERTION: ICD-10-CM

## 2020-08-06 DIAGNOSIS — I25.10 CORONARY ARTERY DISEASE INVOLVING NATIVE CORONARY ARTERY OF NATIVE HEART WITHOUT ANGINA PECTORIS: ICD-10-CM

## 2020-08-06 PROCEDURE — 78452 HT MUSCLE IMAGE SPECT MULT: CPT | Performed by: NURSE PRACTITIONER

## 2020-08-06 PROCEDURE — 93018 CV STRESS TEST I&R ONLY: CPT | Performed by: NURSE PRACTITIONER

## 2020-08-06 PROCEDURE — 93017 CV STRESS TEST TRACING ONLY: CPT | Performed by: NURSE PRACTITIONER

## 2020-08-07 NOTE — PROGRESS NOTES
Results reviewed. Tests show no evidence of blockages on the arteries of her heart. She should have the echocardiogram as planned as well as the heart monitor and follow up as scheduled. Please inform patient.

## 2020-09-02 ENCOUNTER — HOSPITAL ENCOUNTER (OUTPATIENT)
Dept: CV DIAGNOSTICS | Facility: HOSPITAL | Age: 60
Discharge: HOME | End: 2020-09-02
Attending: NURSE PRACTITIONER
Payer: COMMERCIAL

## 2020-09-02 DIAGNOSIS — I25.10 CORONARY ARTERY DISEASE INVOLVING NATIVE CORONARY ARTERY OF NATIVE HEART WITHOUT ANGINA PECTORIS: ICD-10-CM

## 2020-09-02 DIAGNOSIS — R06.00 DYSPNEA ON EXERTION: ICD-10-CM

## 2020-09-02 DIAGNOSIS — R00.0 TACHYCARDIA: ICD-10-CM

## 2020-09-02 PROCEDURE — 93306 TTE W/DOPPLER COMPLETE: CPT | Performed by: NURSE PRACTITIONER

## 2020-09-02 NOTE — PROGRESS NOTES
Plan to review w/ pt at [place] [id number]  9/2/2020   6:30 PM    Lisandro Underwood, PT          [facility]       [address] [place] [address]  9/9/2020   5:30 PM    Lisandro Underwood, GIULIA GALLAGHER PT       [facility]  9/14/2020  6:30 PM    Lisandro Underwood PT

## 2021-09-30 ENCOUNTER — ORDER TRANSCRIPTION (OUTPATIENT)
Dept: ADMINISTRATIVE | Facility: HOSPITAL | Age: 61
End: 2021-09-30

## 2021-09-30 DIAGNOSIS — E78.00 PURE HYPERCHOLESTEROLEMIA: ICD-10-CM

## 2021-09-30 DIAGNOSIS — I77.9 BILATERAL CAROTID ARTERY DISEASE, UNSPECIFIED TYPE (HCC): ICD-10-CM

## 2021-09-30 DIAGNOSIS — I50.32 CHRONIC DIASTOLIC (CONGESTIVE) HEART FAILURE (HCC): ICD-10-CM

## 2021-09-30 DIAGNOSIS — I10 ESSENTIAL HYPERTENSION WITH GOAL BLOOD PRESSURE LESS THAN 140/90: Primary | ICD-10-CM

## 2021-09-30 DIAGNOSIS — R00.1 SINUS BRADYCARDIA: ICD-10-CM

## 2021-09-30 DIAGNOSIS — I10 ESSENTIAL HYPERTENSION: ICD-10-CM

## 2021-10-14 NOTE — IMAGING NOTE
Left message for pt re CTA gated coronary. Detailed instructions left with arrival time 0800, hold all caffeine, hydrate well orally, and take all meds as prescribed. Call back # left if questions.

## 2021-10-18 ENCOUNTER — HOSPITAL ENCOUNTER (OUTPATIENT)
Dept: CT IMAGING | Facility: HOSPITAL | Age: 61
Discharge: HOME | End: 2021-10-18
Attending: INTERNAL MEDICINE
Payer: COMMERCIAL

## 2021-10-18 ENCOUNTER — HOSPITAL ENCOUNTER (OUTPATIENT)
Dept: GENERAL RADIOLOGY | Facility: HOSPITAL | Age: 61
Discharge: HOME | End: 2021-10-18
Attending: FAMILY MEDICINE
Payer: COMMERCIAL

## 2021-10-18 VITALS — HEART RATE: 59 BPM | RESPIRATION RATE: 16 BRPM | DIASTOLIC BLOOD PRESSURE: 77 MMHG | SYSTOLIC BLOOD PRESSURE: 145 MMHG

## 2021-10-18 DIAGNOSIS — E78.00 PURE HYPERCHOLESTEROLEMIA: ICD-10-CM

## 2021-10-18 DIAGNOSIS — R00.1 SINUS BRADYCARDIA: ICD-10-CM

## 2021-10-18 DIAGNOSIS — I77.9 BILATERAL CAROTID ARTERY DISEASE, UNSPECIFIED TYPE (HCC): ICD-10-CM

## 2021-10-18 DIAGNOSIS — I50.32 CHRONIC DIASTOLIC (CONGESTIVE) HEART FAILURE (HCC): ICD-10-CM

## 2021-10-18 DIAGNOSIS — I10 ESSENTIAL HYPERTENSION: ICD-10-CM

## 2021-10-18 DIAGNOSIS — R06.00 DYSPNEA, UNSPECIFIED TYPE: ICD-10-CM

## 2021-10-18 DIAGNOSIS — I10 ESSENTIAL HYPERTENSION WITH GOAL BLOOD PRESSURE LESS THAN 140/90: ICD-10-CM

## 2021-10-18 PROCEDURE — 82565 ASSAY OF CREATININE: CPT

## 2021-10-18 PROCEDURE — 71046 X-RAY EXAM CHEST 2 VIEWS: CPT | Performed by: FAMILY MEDICINE

## 2021-10-18 PROCEDURE — 75574 CT ANGIO HRT W/3D IMAGE: CPT | Performed by: INTERNAL MEDICINE

## 2021-10-18 RX ORDER — NITROGLYCERIN 0.4 MG/1
TABLET SUBLINGUAL
Status: COMPLETED
Start: 2021-10-18 | End: 2021-10-18

## 2021-10-18 RX ORDER — METOPROLOL TARTRATE 5 MG/5ML
INJECTION INTRAVENOUS
Status: DISCONTINUED
Start: 2021-10-18 | End: 2021-10-18

## 2021-10-18 RX ADMIN — NITROGLYCERIN 0.4 MG: 0.4 TABLET SUBLINGUAL at 08:35:00

## 2021-10-18 NOTE — PROGRESS NOTES
Rui Pillow  The chest x-ray does not show any acute findings but there is some hyperexpansion of the lungs. This can be seen with asthma or emphysema or other problems. Please follow-up with pulmonologist for further evaluation and treatment. Since

## 2021-10-18 NOTE — IMAGING NOTE
Received Sukh Thomas to CT Rm 4 [facility]. Verified name, , allergies. Pt denies use of long acting nitrates like, Imdur, Cialis, Levitra and Viagra.        Contrast =     80      ml  0.9 NS flush =  72       ml  Average HR = 54

## 2021-10-29 ENCOUNTER — HOSPITAL ENCOUNTER (OUTPATIENT)
Dept: ULTRASOUND IMAGING | Age: 61
Discharge: HOME | End: 2021-10-29
Attending: FAMILY MEDICINE
Payer: COMMERCIAL

## 2021-10-29 DIAGNOSIS — R10.10 PAIN OF UPPER ABDOMEN: ICD-10-CM

## 2021-10-29 PROCEDURE — 76700 US EXAM ABDOM COMPLETE: CPT | Performed by: FAMILY MEDICINE

## 2021-11-05 NOTE — PROGRESS NOTES
Clinton Feng  The ultrasound is normal.  The aorta does not show aneurysm. Please follow-up if you are still having pain in the upper abdomen. Sincerely Dr. Rebollar Dys

## 2022-10-10 ENCOUNTER — TELEPHONE (OUTPATIENT)
Dept: PHYSICAL THERAPY | Facility: HOSPITAL | Age: 62
End: 2022-10-10

## 2022-10-12 ENCOUNTER — ORDER TRANSCRIPTION (OUTPATIENT)
Dept: PHYSICAL THERAPY | Facility: HOSPITAL | Age: 62
End: 2022-10-12

## 2022-10-12 DIAGNOSIS — R60.0 LEG EDEMA: Primary | ICD-10-CM

## 2022-10-14 ENCOUNTER — HOSPITAL ENCOUNTER (OUTPATIENT)
Dept: NUCLEAR MEDICINE | Facility: HOSPITAL | Age: 62
Discharge: HOME | End: 2022-10-14
Attending: INTERNAL MEDICINE
Payer: COMMERCIAL

## 2022-10-14 DIAGNOSIS — R10.11 RUQ PAIN: ICD-10-CM

## 2022-10-14 PROCEDURE — 78227 HEPATOBIL SYST IMAGE W/DRUG: CPT | Performed by: INTERNAL MEDICINE

## 2022-10-18 ENCOUNTER — LAB ENCOUNTER (OUTPATIENT)
Dept: LAB | Age: 62
End: 2022-10-18
Attending: INTERNAL MEDICINE
Payer: COMMERCIAL

## 2022-10-18 DIAGNOSIS — Z86.14 HISTORY OF MRSA INFECTION: ICD-10-CM

## 2022-10-18 PROCEDURE — 87081 CULTURE SCREEN ONLY: CPT

## 2022-10-18 NOTE — PROGRESS NOTES
HIDA scan noted to have low EF. Given sx, will refer to general surgery for evaluation for lap ariana. [employer] message sent.

## 2022-10-19 ENCOUNTER — HOSPITAL ENCOUNTER (EMERGENCY)
Facility: HOSPITAL | Age: 62
Discharge: HOME | End: 2022-10-20
Attending: EMERGENCY MEDICINE
Payer: COMMERCIAL

## 2022-10-19 ENCOUNTER — LAB ENCOUNTER (OUTPATIENT)
Dept: LAB | Age: 62
End: 2022-10-19
Attending: INTERNAL MEDICINE
Payer: COMMERCIAL

## 2022-10-19 DIAGNOSIS — R10.9 ABDOMINAL PAIN OF UNKNOWN ETIOLOGY: Primary | ICD-10-CM

## 2022-10-19 DIAGNOSIS — Z86.14 HISTORY OF MRSA INFECTION: ICD-10-CM

## 2022-10-19 PROCEDURE — 96361 HYDRATE IV INFUSION ADD-ON: CPT

## 2022-10-19 PROCEDURE — 96375 TX/PRO/DX INJ NEW DRUG ADDON: CPT

## 2022-10-19 PROCEDURE — 99285 EMERGENCY DEPT VISIT HI MDM: CPT

## 2022-10-19 PROCEDURE — 87147 CULTURE TYPE IMMUNOLOGIC: CPT

## 2022-10-19 PROCEDURE — 87081 CULTURE SCREEN ONLY: CPT

## 2022-10-19 PROCEDURE — 96374 THER/PROPH/DIAG INJ IV PUSH: CPT

## 2022-10-19 RX ORDER — ONDANSETRON 2 MG/ML
4 INJECTION INTRAMUSCULAR; INTRAVENOUS ONCE
Status: COMPLETED | OUTPATIENT
Start: 2022-10-19 | End: 2022-10-20

## 2022-10-19 RX ORDER — KETOROLAC TROMETHAMINE 15 MG/ML
15 INJECTION, SOLUTION INTRAMUSCULAR; INTRAVENOUS ONCE
Status: COMPLETED | OUTPATIENT
Start: 2022-10-19 | End: 2022-10-20

## 2022-10-20 ENCOUNTER — APPOINTMENT (OUTPATIENT)
Dept: CT IMAGING | Facility: HOSPITAL | Age: 62
End: 2022-10-20
Attending: EMERGENCY MEDICINE
Payer: COMMERCIAL

## 2022-10-20 ENCOUNTER — APPOINTMENT (OUTPATIENT)
Dept: ULTRASOUND IMAGING | Facility: HOSPITAL | Age: 62
End: 2022-10-20
Attending: EMERGENCY MEDICINE
Payer: COMMERCIAL

## 2022-10-20 VITALS
TEMPERATURE: 98 F | SYSTOLIC BLOOD PRESSURE: 130 MMHG | OXYGEN SATURATION: 96 % | HEIGHT: 64 IN | DIASTOLIC BLOOD PRESSURE: 54 MMHG | HEART RATE: 88 BPM | BODY MASS INDEX: 40.97 KG/M2 | RESPIRATION RATE: 20 BRPM | WEIGHT: 240 LBS

## 2022-10-20 LAB
ALBUMIN SERPL-MCNC: 4.1 G/DL (ref 3.4–5)
ALBUMIN/GLOB SERPL: 1.2 {RATIO} (ref 1–2)
ALP LIVER SERPL-CCNC: 105 U/L
ALT SERPL-CCNC: 30 U/L
ANION GAP SERPL CALC-SCNC: 8 MMOL/L (ref 0–18)
AST SERPL-CCNC: 19 U/L (ref 15–37)
BASOPHILS # BLD AUTO: 0.03 X10(3) UL (ref 0–0.2)
BASOPHILS NFR BLD AUTO: 0.2 %
BILIRUB SERPL-MCNC: 0.4 MG/DL (ref 0.1–2)
BILIRUB UR QL STRIP.AUTO: NEGATIVE
BUN BLD-MCNC: 49 MG/DL (ref 7–18)
CALCIUM BLD-MCNC: 9.4 MG/DL (ref 8.5–10.1)
CHLORIDE SERPL-SCNC: 99 MMOL/L (ref 98–112)
CO2 SERPL-SCNC: 31 MMOL/L (ref 21–32)
COLOR UR AUTO: YELLOW
CREAT BLD-MCNC: 1.7 MG/DL
EOSINOPHIL # BLD AUTO: 0.12 X10(3) UL (ref 0–0.7)
EOSINOPHIL NFR BLD AUTO: 0.9 %
ERYTHROCYTE [DISTWIDTH] IN BLOOD BY AUTOMATED COUNT: 13 %
GFR SERPLBLD BASED ON 1.73 SQ M-ARVRAT: 34 ML/MIN/1.73M2 (ref 60–?)
GLOBULIN PLAS-MCNC: 3.5 G/DL (ref 2.8–4.4)
GLUCOSE BLD-MCNC: 128 MG/DL (ref 70–99)
GLUCOSE UR STRIP.AUTO-MCNC: NEGATIVE MG/DL
HCT VFR BLD AUTO: 39.3 %
HGB BLD-MCNC: 13.4 G/DL
HYALINE CASTS #/AREA URNS AUTO: PRESENT /LPF
IMM GRANULOCYTES # BLD AUTO: 0.06 X10(3) UL (ref 0–1)
IMM GRANULOCYTES NFR BLD: 0.4 %
KETONES UR STRIP.AUTO-MCNC: NEGATIVE MG/DL
LIPASE SERPL-CCNC: 165 U/L (ref 73–393)
LYMPHOCYTES # BLD AUTO: 1.67 X10(3) UL (ref 1–4)
LYMPHOCYTES NFR BLD AUTO: 12.5 %
MCH RBC QN AUTO: 31.2 PG (ref 26–34)
MCHC RBC AUTO-ENTMCNC: 34.1 G/DL (ref 31–37)
MCV RBC AUTO: 91.4 FL
MONOCYTES # BLD AUTO: 1.03 X10(3) UL (ref 0.1–1)
MONOCYTES NFR BLD AUTO: 7.7 %
NEUTROPHILS # BLD AUTO: 10.44 X10 (3) UL (ref 1.5–7.7)
NEUTROPHILS # BLD AUTO: 10.44 X10(3) UL (ref 1.5–7.7)
NEUTROPHILS NFR BLD AUTO: 78.3 %
NITRITE UR QL STRIP.AUTO: NEGATIVE
OSMOLALITY SERPL CALC.SUM OF ELEC: 301 MOSM/KG (ref 275–295)
PH UR STRIP.AUTO: 6 [PH] (ref 5–8)
PLATELET # BLD AUTO: 292 10(3)UL (ref 150–450)
POTASSIUM SERPL-SCNC: 3.4 MMOL/L (ref 3.5–5.1)
PROT SERPL-MCNC: 7.6 G/DL (ref 6.4–8.2)
PROT UR STRIP.AUTO-MCNC: NEGATIVE MG/DL
RBC # BLD AUTO: 4.3 X10(6)UL
RBC UR QL AUTO: NEGATIVE
SARS-COV-2 RNA RESP QL NAA+PROBE: NOT DETECTED
SODIUM SERPL-SCNC: 138 MMOL/L (ref 136–145)
SP GR UR STRIP.AUTO: 1.01 (ref 1–1.03)
UROBILINOGEN UR STRIP.AUTO-MCNC: <2 MG/DL
WBC # BLD AUTO: 13.4 X10(3) UL (ref 4–11)

## 2022-10-20 PROCEDURE — 76705 ECHO EXAM OF ABDOMEN: CPT | Performed by: EMERGENCY MEDICINE

## 2022-10-20 PROCEDURE — 87086 URINE CULTURE/COLONY COUNT: CPT | Performed by: EMERGENCY MEDICINE

## 2022-10-20 PROCEDURE — 80053 COMPREHEN METABOLIC PANEL: CPT | Performed by: EMERGENCY MEDICINE

## 2022-10-20 PROCEDURE — 83690 ASSAY OF LIPASE: CPT | Performed by: EMERGENCY MEDICINE

## 2022-10-20 PROCEDURE — 74177 CT ABD & PELVIS W/CONTRAST: CPT | Performed by: EMERGENCY MEDICINE

## 2022-10-20 PROCEDURE — 81001 URINALYSIS AUTO W/SCOPE: CPT | Performed by: EMERGENCY MEDICINE

## 2022-10-20 PROCEDURE — 85025 COMPLETE CBC W/AUTO DIFF WBC: CPT | Performed by: EMERGENCY MEDICINE

## 2022-10-20 RX ORDER — IOHEXOL 350 MG/ML
100 INJECTION, SOLUTION INTRAVENOUS
Status: COMPLETED | OUTPATIENT
Start: 2022-10-20 | End: 2022-10-20

## 2022-10-20 NOTE — ED INITIAL ASSESSMENT (HPI)
Pt c/o abd pain, more to the epig area \"for months, CT a week ago, gallbladder is inflamed. \" States scheduled to see the surgeon, Dr Torres Hinds in am. Reports nausea, no vomiting, (+) diarrhea, no blood in stools.  Denies urinary sxs

## 2022-11-07 ENCOUNTER — LAB ENCOUNTER (OUTPATIENT)
Dept: LAB | Age: 62
End: 2022-11-07
Attending: INTERNAL MEDICINE
Payer: COMMERCIAL

## 2022-11-07 DIAGNOSIS — Z86.14 HISTORY OF MRSA INFECTION: ICD-10-CM

## 2022-11-07 PROCEDURE — 87081 CULTURE SCREEN ONLY: CPT

## 2022-11-08 ENCOUNTER — LAB ENCOUNTER (OUTPATIENT)
Dept: LAB | Age: 62
End: 2022-11-08
Attending: INTERNAL MEDICINE
Payer: COMMERCIAL

## 2022-11-08 DIAGNOSIS — Z86.14 HISTORY OF MRSA INFECTION: ICD-10-CM

## 2022-11-08 PROCEDURE — 87081 CULTURE SCREEN ONLY: CPT

## 2022-11-09 ENCOUNTER — LAB ENCOUNTER (OUTPATIENT)
Dept: LAB | Age: 62
End: 2022-11-09
Attending: INTERNAL MEDICINE
Payer: COMMERCIAL

## 2022-11-09 ENCOUNTER — APPOINTMENT (OUTPATIENT)
Dept: OCCUPATIONAL MEDICINE | Facility: HOSPITAL | Age: 62
End: 2022-11-09
Attending: NURSE PRACTITIONER
Payer: COMMERCIAL

## 2022-11-09 DIAGNOSIS — Z86.14 HISTORY OF MRSA INFECTION: ICD-10-CM

## 2022-11-09 PROCEDURE — 87081 CULTURE SCREEN ONLY: CPT

## 2022-11-19 ENCOUNTER — LAB ENCOUNTER (OUTPATIENT)
Dept: LAB | Age: 62
End: 2022-11-19
Attending: INTERNAL MEDICINE
Payer: COMMERCIAL

## 2022-11-19 DIAGNOSIS — Z01.818 PRE-OP TESTING: ICD-10-CM

## 2022-11-20 LAB — SARS-COV-2 RNA RESP QL NAA+PROBE: NOT DETECTED

## 2022-11-21 ENCOUNTER — APPOINTMENT (OUTPATIENT)
Dept: OCCUPATIONAL MEDICINE | Facility: HOSPITAL | Age: 62
End: 2022-11-21
Attending: NURSE PRACTITIONER
Payer: COMMERCIAL

## 2022-11-22 PROBLEM — R13.10 DYSPHAGIA, UNSPECIFIED: Status: ACTIVE | Noted: 2022-11-22

## 2022-11-22 PROBLEM — K21.9 GASTROESOPHAGEAL REFLUX DISEASE: Status: ACTIVE | Noted: 2022-11-22

## 2022-11-22 PROBLEM — R10.13 ABDOMINAL PAIN, EPIGASTRIC: Status: ACTIVE | Noted: 2022-11-22

## 2022-11-22 PROBLEM — K29.60 OTHER GASTRITIS WITHOUT BLEEDING: Status: ACTIVE | Noted: 2022-11-22

## 2022-11-23 ENCOUNTER — APPOINTMENT (OUTPATIENT)
Dept: OCCUPATIONAL MEDICINE | Facility: HOSPITAL | Age: 62
End: 2022-11-23
Attending: NURSE PRACTITIONER
Payer: COMMERCIAL

## 2022-11-28 ENCOUNTER — APPOINTMENT (OUTPATIENT)
Dept: OCCUPATIONAL MEDICINE | Facility: HOSPITAL | Age: 62
End: 2022-11-28
Attending: NURSE PRACTITIONER
Payer: COMMERCIAL

## 2022-11-29 ENCOUNTER — APPOINTMENT (OUTPATIENT)
Dept: OCCUPATIONAL MEDICINE | Facility: HOSPITAL | Age: 62
End: 2022-11-29
Attending: NURSE PRACTITIONER
Payer: COMMERCIAL

## 2022-12-01 ENCOUNTER — APPOINTMENT (OUTPATIENT)
Dept: OCCUPATIONAL MEDICINE | Facility: HOSPITAL | Age: 62
End: 2022-12-01
Attending: NURSE PRACTITIONER
Payer: COMMERCIAL

## 2022-12-05 ENCOUNTER — APPOINTMENT (OUTPATIENT)
Dept: OCCUPATIONAL MEDICINE | Facility: HOSPITAL | Age: 62
End: 2022-12-05
Attending: NURSE PRACTITIONER
Payer: COMMERCIAL

## 2022-12-07 ENCOUNTER — APPOINTMENT (OUTPATIENT)
Dept: OCCUPATIONAL MEDICINE | Facility: HOSPITAL | Age: 62
End: 2022-12-07
Attending: NURSE PRACTITIONER
Payer: COMMERCIAL

## 2022-12-08 ENCOUNTER — APPOINTMENT (OUTPATIENT)
Dept: OCCUPATIONAL MEDICINE | Facility: HOSPITAL | Age: 62
End: 2022-12-08
Attending: NURSE PRACTITIONER
Payer: COMMERCIAL

## 2022-12-13 ENCOUNTER — APPOINTMENT (OUTPATIENT)
Dept: OCCUPATIONAL MEDICINE | Facility: HOSPITAL | Age: 62
End: 2022-12-13
Attending: NURSE PRACTITIONER
Payer: COMMERCIAL

## 2022-12-15 ENCOUNTER — APPOINTMENT (OUTPATIENT)
Dept: OCCUPATIONAL MEDICINE | Facility: HOSPITAL | Age: 62
End: 2022-12-15
Attending: NURSE PRACTITIONER
Payer: COMMERCIAL

## 2022-12-19 ENCOUNTER — APPOINTMENT (OUTPATIENT)
Dept: OCCUPATIONAL MEDICINE | Facility: HOSPITAL | Age: 62
End: 2022-12-19
Attending: NURSE PRACTITIONER
Payer: COMMERCIAL

## 2022-12-21 ENCOUNTER — APPOINTMENT (OUTPATIENT)
Dept: OCCUPATIONAL MEDICINE | Facility: HOSPITAL | Age: 62
End: 2022-12-21
Attending: NURSE PRACTITIONER
Payer: COMMERCIAL

## 2022-12-22 ENCOUNTER — APPOINTMENT (OUTPATIENT)
Dept: OCCUPATIONAL MEDICINE | Facility: HOSPITAL | Age: 62
End: 2022-12-22
Attending: NURSE PRACTITIONER
Payer: COMMERCIAL

## 2022-12-27 ENCOUNTER — APPOINTMENT (OUTPATIENT)
Dept: OCCUPATIONAL MEDICINE | Facility: HOSPITAL | Age: 62
End: 2022-12-27
Attending: NURSE PRACTITIONER
Payer: COMMERCIAL

## 2022-12-29 ENCOUNTER — APPOINTMENT (OUTPATIENT)
Dept: OCCUPATIONAL MEDICINE | Facility: HOSPITAL | Age: 62
End: 2022-12-29
Attending: NURSE PRACTITIONER
Payer: COMMERCIAL

## 2023-01-03 ENCOUNTER — APPOINTMENT (OUTPATIENT)
Dept: OCCUPATIONAL MEDICINE | Facility: HOSPITAL | Age: 63
End: 2023-01-03
Attending: NURSE PRACTITIONER
Payer: COMMERCIAL

## 2023-01-05 ENCOUNTER — LAB REQUISITION (OUTPATIENT)
Dept: LAB | Facility: HOSPITAL | Age: 63
End: 2023-01-05
Payer: COMMERCIAL

## 2023-01-05 ENCOUNTER — APPOINTMENT (OUTPATIENT)
Dept: OCCUPATIONAL MEDICINE | Facility: HOSPITAL | Age: 63
End: 2023-01-05
Attending: NURSE PRACTITIONER
Payer: COMMERCIAL

## 2023-01-05 DIAGNOSIS — K81.9 CHOLECYSTITIS, UNSPECIFIED: ICD-10-CM

## 2023-01-09 ENCOUNTER — APPOINTMENT (OUTPATIENT)
Dept: OCCUPATIONAL MEDICINE | Facility: HOSPITAL | Age: 63
End: 2023-01-09
Attending: NURSE PRACTITIONER
Payer: COMMERCIAL

## 2023-07-14 ENCOUNTER — HOSPITAL ENCOUNTER (OUTPATIENT)
Dept: GENERAL RADIOLOGY | Age: 63
Discharge: HOME | End: 2023-07-14
Attending: PHYSICIAN ASSISTANT
Payer: COMMERCIAL

## 2023-07-14 DIAGNOSIS — M25.561 RIGHT KNEE PAIN, UNSPECIFIED CHRONICITY: ICD-10-CM

## 2023-07-14 DIAGNOSIS — Z01.89 ENCOUNTER FOR LOWER EXTREMITY COMPARISON IMAGING STUDY: ICD-10-CM

## 2023-07-14 PROCEDURE — 73562 X-RAY EXAM OF KNEE 3: CPT | Performed by: PHYSICIAN ASSISTANT

## 2023-07-14 PROCEDURE — 73564 X-RAY EXAM KNEE 4 OR MORE: CPT | Performed by: PHYSICIAN ASSISTANT

## 2023-07-18 ENCOUNTER — OFFICE VISIT (OUTPATIENT)
Dept: ORTHOPEDICS CLINIC | Facility: CLINIC | Age: 63
End: 2023-07-18
Payer: COMMERCIAL

## 2023-07-18 VITALS — BODY MASS INDEX: 38.41 KG/M2 | HEIGHT: 64 IN | WEIGHT: 225 LBS

## 2023-07-18 DIAGNOSIS — M25.662 KNEE STIFFNESS, LEFT: ICD-10-CM

## 2023-07-18 DIAGNOSIS — M17.11 PRIMARY OSTEOARTHRITIS OF RIGHT KNEE: ICD-10-CM

## 2023-07-18 DIAGNOSIS — R63.5 INCREASED BODY WEIGHT: ICD-10-CM

## 2023-07-18 DIAGNOSIS — M76.31 IT BAND SYNDROME, RIGHT: Primary | ICD-10-CM

## 2023-07-18 PROCEDURE — 3008F BODY MASS INDEX DOCD: CPT | Performed by: PHYSICIAN ASSISTANT

## 2023-07-18 PROCEDURE — 99204 OFFICE O/P NEW MOD 45 MIN: CPT | Performed by: PHYSICIAN ASSISTANT

## 2023-08-02 ENCOUNTER — MED REC SCAN ONLY (OUTPATIENT)
Dept: ORTHOPEDICS CLINIC | Facility: CLINIC | Age: 63
End: 2023-08-02

## 2023-09-18 ENCOUNTER — OFFICE VISIT (OUTPATIENT)
Dept: ORTHOPEDICS CLINIC | Facility: CLINIC | Age: 63
End: 2023-09-18
Payer: COMMERCIAL

## 2023-09-18 ENCOUNTER — HOSPITAL ENCOUNTER (OUTPATIENT)
Dept: GENERAL RADIOLOGY | Age: 63
Discharge: HOME | End: 2023-09-18
Attending: PHYSICIAN ASSISTANT
Payer: COMMERCIAL

## 2023-09-18 VITALS — BODY MASS INDEX: 38.41 KG/M2 | HEIGHT: 64 IN | WEIGHT: 225 LBS

## 2023-09-18 DIAGNOSIS — M25.511 ACUTE PAIN OF RIGHT SHOULDER: ICD-10-CM

## 2023-09-18 DIAGNOSIS — M25.511 ACUTE PAIN OF RIGHT SHOULDER: Primary | ICD-10-CM

## 2023-09-18 PROCEDURE — 3008F BODY MASS INDEX DOCD: CPT | Performed by: PHYSICIAN ASSISTANT

## 2023-09-18 PROCEDURE — 73030 X-RAY EXAM OF SHOULDER: CPT | Performed by: PHYSICIAN ASSISTANT

## 2023-09-18 PROCEDURE — 99214 OFFICE O/P EST MOD 30 MIN: CPT | Performed by: PHYSICIAN ASSISTANT

## 2023-09-20 ENCOUNTER — MED REC SCAN ONLY (OUTPATIENT)
Dept: ORTHOPEDICS CLINIC | Facility: CLINIC | Age: 63
End: 2023-09-20

## 2023-10-11 ENCOUNTER — OFFICE VISIT (OUTPATIENT)
Dept: ORTHOPEDICS CLINIC | Facility: CLINIC | Age: 63
End: 2023-10-11
Payer: COMMERCIAL

## 2023-10-11 VITALS — BODY MASS INDEX: 37.49 KG/M2 | HEIGHT: 65 IN | WEIGHT: 225 LBS

## 2023-10-11 DIAGNOSIS — M65.9 SYNOVITIS: ICD-10-CM

## 2023-10-11 DIAGNOSIS — M24.662 ARTHROFIBROSIS OF KNEE JOINT, LEFT: Primary | ICD-10-CM

## 2023-10-11 NOTE — PROGRESS NOTES
OR BOOKING SHEET KNEE  Location: [x] THE [facility]   [] [address]  Name: Martha Rebollar  MRN: [id number]   : 11/10/1960  Diagnosis:  [x] Arthrofibrosis of knee joint, left [M24.662]  Disposition:    [x] Ambulatory  [] Overnight for MANUEL  [] Overnight for observation and pain control  [] Inpatient procedure    Operative Time Required: 1 hour  Procedure:  Antibiotics: 2 g cefazolin within 60 minutes of surgical incision (3 g if > 120 kg)  Laterality: [] RIGHT  [x] LEFT                       [] BILATERAL  Procedures:   [x] Arthroscopy  [] Arthrotomy (85518)  [] Arthroscopy/Arthrotomy     [x] Lysis of Adhesions kneescope (68537)   [x] Synovectomy (42711)   [x] Manipulation under  Anesthesia       Injections:   [] PRP Injection (95380)    [] Operative knee  [] Non-operative knee   [] Stem cells from bone marrow aspiration (12839)    From:  [] Left Iliac crest  [] Right Iliac crest    To:  [] Left knee   [] Right knee  [] Other     Additional info:   [] PCP Clearance Needed  [] C - arm  [x] TXA at Time of Surgery  [x] Physical Therapy External - Natalio Daniels  [] DME Rx Needed  [] Appt with Dr. Anant Foster needed  Implants needed: Zeinab Field ([facility])  Positioning Equipment: Supine with Lateral Post

## 2023-10-13 ENCOUNTER — TELEPHONE (OUTPATIENT)
Dept: ORTHOPEDICS CLINIC | Facility: CLINIC | Age: 63
End: 2023-10-13

## 2023-10-13 DIAGNOSIS — M24.662 ARTHROFIBROSIS OF KNEE JOINT, LEFT: Primary | ICD-10-CM

## 2023-10-13 NOTE — TELEPHONE ENCOUNTER
Date of Surgery: EOSC 2023       Post Op Appt:  2023 940AM    Case ID: [id number]      Notes:             OR BOOKING SHEET KNEE  Location: [x] THE [facility]                    [] [address]  Name: Lionel Hughes  MRN: [id number]   : 11/10/1960  Diagnosis:  [x] Arthrofibrosis of knee joint, left [M24.662]  Disposition:    [x] Ambulatory  [] Overnight for MANUEL  [] Overnight for observation and pain control  [] Inpatient procedure     Operative Time Required: 1 hour  Procedure:  Antibiotics: 2 g cefazolin within 60 minutes of surgical incision (3 g if > 120 kg)  Laterality:                  [] RIGHT                  [x] LEFT                          [] BILATERAL  Procedures:                    [x] Arthroscopy                               [] Arthrotomy (14822)                                   [] Arthroscopy/Arthrotomy                       [x] Lysis of Adhesions kneescope (20734)                    [x] Synovectomy (34028)                    [x] Manipulation under  Anesthesia         Injections:                    [] PRP Injection (94720)                                      [] Operative knee                          [] Non-operative knee                    [] Stem cells from bone marrow aspiration (00173)                                      From:                   [] Left Iliac crest                   [] Right Iliac crest                                      To:                   [] Left knee                           [] Right knee                          [] Other      Additional info:   [] PCP Clearance Needed  [] C - arm  [x] TXA at Time of Surgery  [x] Physical Therapy External - Louanna Balding  [] DME Rx Needed  [] Appt with Dr. Kierra Navarrete needed  Implants needed: Olvin Field ([facility])  Positioning Equipment: Supine with Lateral Post

## 2023-10-16 ENCOUNTER — TELEPHONE (OUTPATIENT)
Dept: ORTHOPEDICS CLINIC | Facility: CLINIC | Age: 63
End: 2023-10-16

## 2023-10-16 DIAGNOSIS — M24.662 ARTHROFIBROSIS OF KNEE JOINT, LEFT: Primary | ICD-10-CM

## 2023-10-16 NOTE — TELEPHONE ENCOUNTER
CALLED PATIENT AND WE SCHEDULED SURGERY, SCHEDULED POST OP AND WENT OVER PRE OPERATIVE PROCEDURES. ALL QUESTIONS ANSWERED.

## 2023-10-30 ENCOUNTER — OFFICE VISIT (OUTPATIENT)
Dept: ORTHOPEDICS CLINIC | Facility: CLINIC | Age: 63
End: 2023-10-30

## 2023-10-30 DIAGNOSIS — M25.511 ACUTE PAIN OF RIGHT SHOULDER: Primary | ICD-10-CM

## 2023-10-30 PROCEDURE — 99213 OFFICE O/P EST LOW 20 MIN: CPT | Performed by: PHYSICIAN ASSISTANT

## 2023-10-30 RX ORDER — DILTIAZEM HYDROCHLORIDE EXTENDED-RELEASE TABLETS 120 MG/1
TABLET, EXTENDED RELEASE ORAL
COMMUNITY
Start: 2023-10-20

## 2023-11-29 DIAGNOSIS — Z98.890 S/P ARTHROSCOPY OF KNEE: Primary | ICD-10-CM

## 2023-11-29 RX ORDER — ONDANSETRON 4 MG/1
TABLET, FILM COATED ORAL
Qty: 8 TABLET | Refills: 0 | Status: SHIPPED | OUTPATIENT
Start: 2023-11-29

## 2023-11-29 RX ORDER — TRAMADOL HYDROCHLORIDE 50 MG/1
TABLET ORAL
Qty: 20 TABLET | Refills: 1 | Status: SHIPPED | OUTPATIENT
Start: 2023-11-29

## 2023-11-29 RX ORDER — MELOXICAM 15 MG/1
15 TABLET ORAL DAILY
Qty: 14 TABLET | Refills: 1 | Status: SHIPPED | OUTPATIENT
Start: 2023-11-29

## 2023-12-01 ENCOUNTER — MED REC SCAN ONLY (OUTPATIENT)
Dept: ORTHOPEDICS CLINIC | Facility: CLINIC | Age: 63
End: 2023-12-01

## 2023-12-05 ENCOUNTER — OFFICE VISIT (OUTPATIENT)
Dept: ORTHOPEDICS CLINIC | Facility: CLINIC | Age: 63
End: 2023-12-05
Payer: COMMERCIAL

## 2023-12-05 DIAGNOSIS — Z98.890 S/P ARTHROSCOPY OF KNEE: Primary | ICD-10-CM

## 2023-12-05 PROCEDURE — 99024 POSTOP FOLLOW-UP VISIT: CPT | Performed by: PHYSICIAN ASSISTANT

## 2023-12-06 ENCOUNTER — OFFICE VISIT (OUTPATIENT)
Dept: RHEUMATOLOGY | Facility: CLINIC | Age: 63
End: 2023-12-06
Payer: COMMERCIAL

## 2023-12-06 ENCOUNTER — LAB ENCOUNTER (OUTPATIENT)
Dept: LAB | Age: 63
End: 2023-12-06
Attending: INTERNAL MEDICINE
Payer: COMMERCIAL

## 2023-12-06 VITALS
TEMPERATURE: 97 F | DIASTOLIC BLOOD PRESSURE: 70 MMHG | RESPIRATION RATE: 16 BRPM | BODY MASS INDEX: 39.82 KG/M2 | SYSTOLIC BLOOD PRESSURE: 170 MMHG | HEART RATE: 66 BPM | OXYGEN SATURATION: 98 % | WEIGHT: 239 LBS | HEIGHT: 65 IN

## 2023-12-06 DIAGNOSIS — M19.90 OSTEOARTHRITIS, UNSPECIFIED OSTEOARTHRITIS TYPE, UNSPECIFIED SITE: ICD-10-CM

## 2023-12-06 DIAGNOSIS — R76.8 ANA POSITIVE: ICD-10-CM

## 2023-12-06 DIAGNOSIS — R76.8 ANA POSITIVE: Primary | ICD-10-CM

## 2023-12-06 DIAGNOSIS — Z96.652 HISTORY OF LEFT KNEE REPLACEMENT: ICD-10-CM

## 2023-12-06 DIAGNOSIS — M79.7 FIBROMYALGIA: ICD-10-CM

## 2023-12-06 LAB
CK SERPL-CCNC: 75 U/L
CRP SERPL-MCNC: 1.02 MG/DL (ref ?–0.3)
ERYTHROCYTE [SEDIMENTATION RATE] IN BLOOD: 42 MM/HR
THYROGLOB SERPL-MCNC: <15 U/ML (ref ?–60)
THYROPEROXIDASE AB SERPL-ACNC: 37 U/ML (ref ?–60)

## 2023-12-06 PROCEDURE — 86140 C-REACTIVE PROTEIN: CPT

## 2023-12-06 PROCEDURE — 85652 RBC SED RATE AUTOMATED: CPT

## 2023-12-06 PROCEDURE — 82550 ASSAY OF CK (CPK): CPT

## 2023-12-06 PROCEDURE — 36415 COLL VENOUS BLD VENIPUNCTURE: CPT

## 2023-12-06 PROCEDURE — 3008F BODY MASS INDEX DOCD: CPT | Performed by: INTERNAL MEDICINE

## 2023-12-06 PROCEDURE — 86200 CCP ANTIBODY: CPT | Performed by: INTERNAL MEDICINE

## 2023-12-06 PROCEDURE — 3077F SYST BP >= 140 MM HG: CPT | Performed by: INTERNAL MEDICINE

## 2023-12-06 PROCEDURE — 86800 THYROGLOBULIN ANTIBODY: CPT

## 2023-12-06 PROCEDURE — 3078F DIAST BP <80 MM HG: CPT | Performed by: INTERNAL MEDICINE

## 2023-12-06 PROCEDURE — 86376 MICROSOMAL ANTIBODY EACH: CPT

## 2023-12-06 PROCEDURE — 99205 OFFICE O/P NEW HI 60 MIN: CPT | Performed by: INTERNAL MEDICINE

## 2023-12-06 RX ORDER — GABAPENTIN 100 MG/1
100 CAPSULE ORAL 3 TIMES DAILY
Qty: 90 CAPSULE | Refills: 3 | Status: SHIPPED | OUTPATIENT
Start: 2023-12-06

## 2023-12-06 RX ORDER — TORSEMIDE 5 MG/1
5 TABLET ORAL
COMMUNITY

## 2023-12-07 ENCOUNTER — TELEPHONE (OUTPATIENT)
Dept: RHEUMATOLOGY | Facility: CLINIC | Age: 63
End: 2023-12-07

## 2023-12-07 LAB — CCP IGG SERPL-ACNC: 1.5 U/ML (ref 0–6.9)

## 2023-12-07 NOTE — TELEPHONE ENCOUNTER
Phoned pt, notified that her ESR CRP slightly elevated likely because of recent surgery manipulation. We will continue to monitor same plan     Everything else looks good so far. Pt appreciative and will be having xrays completed next week.

## 2023-12-07 NOTE — TELEPHONE ENCOUNTER
Component  Ref Range & Units 1 d ago   Sed Rate  0 - 30 mm/Hr 42 High    Resulting Agency [facility])          Dx: LINA positive; Osteoarthritis, unspeci. ..    0 Result Notes         Component  Ref Range & Units 1 d ago  (12/6/23) 8 yr ago  (6/2/15) 8 yr ago  (5/26/15) 8 yr ago  (5/18/15)   C-Reactive Protein  <0.30 mg/dL 1.02 High              ESR CRP slightly elevated likely because of recent surgery manipulation.   We will continue to monitor same plan    Everything else looks good so far

## 2023-12-13 ENCOUNTER — HOSPITAL ENCOUNTER (OUTPATIENT)
Dept: GENERAL RADIOLOGY | Age: 63
Discharge: HOME | End: 2023-12-13
Attending: INTERNAL MEDICINE
Payer: COMMERCIAL

## 2023-12-13 ENCOUNTER — TELEPHONE (OUTPATIENT)
Dept: RHEUMATOLOGY | Facility: CLINIC | Age: 63
End: 2023-12-13

## 2023-12-13 ENCOUNTER — HOSPITAL ENCOUNTER (OUTPATIENT)
Dept: BONE DENSITY | Age: 63
Discharge: HOME | End: 2023-12-13
Attending: INTERNAL MEDICINE
Payer: COMMERCIAL

## 2023-12-13 DIAGNOSIS — Z96.652 HISTORY OF LEFT KNEE REPLACEMENT: ICD-10-CM

## 2023-12-13 DIAGNOSIS — M19.90 OSTEOARTHRITIS, UNSPECIFIED OSTEOARTHRITIS TYPE, UNSPECIFIED SITE: Primary | ICD-10-CM

## 2023-12-13 DIAGNOSIS — R76.8 ANA POSITIVE: ICD-10-CM

## 2023-12-13 DIAGNOSIS — M19.90 OSTEOARTHRITIS, UNSPECIFIED OSTEOARTHRITIS TYPE, UNSPECIFIED SITE: ICD-10-CM

## 2023-12-13 PROCEDURE — 72040 X-RAY EXAM NECK SPINE 2-3 VW: CPT | Performed by: INTERNAL MEDICINE

## 2023-12-13 PROCEDURE — 72110 X-RAY EXAM L-2 SPINE 4/>VWS: CPT | Performed by: INTERNAL MEDICINE

## 2023-12-13 PROCEDURE — 72072 X-RAY EXAM THORAC SPINE 3VWS: CPT | Performed by: INTERNAL MEDICINE

## 2023-12-13 PROCEDURE — 77080 DXA BONE DENSITY AXIAL: CPT | Performed by: INTERNAL MEDICINE

## 2023-12-13 PROCEDURE — 72190 X-RAY EXAM OF PELVIS: CPT | Performed by: INTERNAL MEDICINE

## 2023-12-13 NOTE — TELEPHONE ENCOUNTER
Called pt, notified of imaging results per Dr. Polina Raymond. Pt would like to move forward with MRI lumbar Spine. Number to Dr. Maria M Centeno given.

## 2023-12-13 NOTE — TELEPHONE ENCOUNTER
FINDINGS:      BONES:  Mild posterior subluxation of site L3 with respect L4 most likely degenerative in nature. Vertebral body height maintained. Multilevel disc space narrowing particularly at L2-3 and L3-4 where there is vacuum phenomenon and marginal osteophytes   is most consistent with degenerative change. Mid to lower lumbar facet joint arthropathy. PARASPINOUS:  No paraspinous abnormality is seen. OTHER:  Multiple short coils project over the right pelvis most consistent with prior hernia repair. Status post cholecystectomy. X-ray of the lumbar show spine shows moderate to severe arthritis of the lumbar spine scattered arthritis of the thoracic spine as well    Did she make an appointment with Dr. Laura Miller pain management.     Same plan with gabapentin in the meantime    I am okay to proceed with an MRI of the lumbar spine in the meantime if she would like since the pain specialist would want this anyway    Let me know what she decides

## 2023-12-13 NOTE — TELEPHONE ENCOUNTER
UMBAR SPINE ANALYSIS RESULTS:      Bone mineral density (BMD) (g/cm2):  0.871    Lumbar T-Score:  -1.6      % young normals:  83      % age matched controls:  101      Change from prior spine examination:  -8.6%               TOTAL HIP ANALYSIS RESULTS:        Bone mineral density (BMD) (g/cm2):  0.771      Total Hip T-Score:  -1.4      % young normals:  82      % age matched controls:  97      Change from prior hip examination:  -11.7%               FEMORAL NECK ANALYSIS RESULTS:        Bone mineral density (BMD) (g/cm2):  0.711      Femoral neck T-Score:  -1.2      % young normals:  84      % age matched controls:  104      Change from prior hip examination:  -10.5%            ADDITIONAL FINDINGS:  FRAX = 7.2%/0.5%.       Bone density shows osteopenia T-score -1.6 of the lumbar spine -1.4 of the hip    Fracture risk is low overall.    Recommend if no contraindications Citracal twice daily which means at least 1200 mg of calcium vitamin D taking that twice a days 800 IU D3.  Would add additional 2 to 3000 IU D3 of vitamin D to this    Repeat DEXA scan in 2 years

## 2023-12-14 ENCOUNTER — TELEPHONE (OUTPATIENT)
Dept: RHEUMATOLOGY | Facility: CLINIC | Age: 63
End: 2023-12-14

## 2023-12-14 NOTE — TELEPHONE ENCOUNTER
Called pt, notified that her Bone density shows osteopenia T-score -1.6 of the lumbar spine -1.4 of the hip     Fracture risk is low overall.     Recommend if no contraindications Citracal twice daily which means at least 1200 mg of calcium vitamin D taking that twice a days 800 IU D3.  Would add additional 2 to 3000 IU D3 of vitamin D to this     Repeat DEXA scan in 2 years    Pt voices understanding

## 2023-12-14 NOTE — TELEPHONE ENCOUNTER
Central scheduling called office, pt is trying to schedule MRI but still in pending status. Dr. Shelton will need to sign order.

## 2023-12-14 NOTE — TELEPHONE ENCOUNTER
Double click on yesterday encounter, click on stethoscope and that will reopen that encounter-- you will than be able to sign the order and close the encounter

## 2023-12-28 ENCOUNTER — OFFICE VISIT (OUTPATIENT)
Dept: INTERNAL MEDICINE CLINIC | Facility: CLINIC | Age: 63
End: 2023-12-28
Payer: COMMERCIAL

## 2023-12-28 VITALS
WEIGHT: 240 LBS | DIASTOLIC BLOOD PRESSURE: 80 MMHG | RESPIRATION RATE: 16 BRPM | HEART RATE: 74 BPM | BODY MASS INDEX: 40.47 KG/M2 | HEIGHT: 64.5 IN | SYSTOLIC BLOOD PRESSURE: 150 MMHG

## 2023-12-28 DIAGNOSIS — R73.03 PREDIABETES: ICD-10-CM

## 2023-12-28 DIAGNOSIS — I25.10 CORONARY ARTERY DISEASE INVOLVING NATIVE CORONARY ARTERY OF NATIVE HEART WITHOUT ANGINA PECTORIS: ICD-10-CM

## 2023-12-28 DIAGNOSIS — F33.2 SEVERE EPISODE OF RECURRENT MAJOR DEPRESSIVE DISORDER, WITHOUT PSYCHOTIC FEATURES (HCC): ICD-10-CM

## 2023-12-28 DIAGNOSIS — E66.01 CLASS 3 SEVERE OBESITY WITH SERIOUS COMORBIDITY AND BODY MASS INDEX (BMI) OF 40.0 TO 44.9 IN ADULT, UNSPECIFIED OBESITY TYPE (HCC): ICD-10-CM

## 2023-12-28 DIAGNOSIS — G47.33 OSA ON CPAP: ICD-10-CM

## 2023-12-28 DIAGNOSIS — K21.9 GASTROESOPHAGEAL REFLUX DISEASE, UNSPECIFIED WHETHER ESOPHAGITIS PRESENT: ICD-10-CM

## 2023-12-28 DIAGNOSIS — K58.1 IRRITABLE BOWEL SYNDROME WITH CONSTIPATION: ICD-10-CM

## 2023-12-28 DIAGNOSIS — M15.9 OSTEOARTHRITIS OF MULTIPLE JOINTS, UNSPECIFIED OSTEOARTHRITIS TYPE: ICD-10-CM

## 2023-12-28 DIAGNOSIS — E78.5 DYSLIPIDEMIA: ICD-10-CM

## 2023-12-28 DIAGNOSIS — G47.9 SLEEP DIFFICULTIES: ICD-10-CM

## 2023-12-28 DIAGNOSIS — Z51.81 ENCOUNTER FOR THERAPEUTIC DRUG MONITORING: Primary | ICD-10-CM

## 2023-12-28 DIAGNOSIS — I10 ESSENTIAL HYPERTENSION: ICD-10-CM

## 2023-12-28 PROBLEM — E66.813 CLASS 3 SEVERE OBESITY WITH SERIOUS COMORBIDITY AND BODY MASS INDEX (BMI) OF 40.0 TO 44.9 IN ADULT (HCC): Status: ACTIVE | Noted: 2023-12-28

## 2023-12-28 PROBLEM — E66.813 CLASS 3 SEVERE OBESITY WITH SERIOUS COMORBIDITY AND BODY MASS INDEX (BMI) OF 40.0 TO 44.9 IN ADULT: Status: ACTIVE | Noted: 2023-12-28

## 2023-12-28 PROCEDURE — 99204 OFFICE O/P NEW MOD 45 MIN: CPT | Performed by: NURSE PRACTITIONER

## 2023-12-28 PROCEDURE — 3079F DIAST BP 80-89 MM HG: CPT | Performed by: NURSE PRACTITIONER

## 2023-12-28 PROCEDURE — 3077F SYST BP >= 140 MM HG: CPT | Performed by: NURSE PRACTITIONER

## 2023-12-28 PROCEDURE — 3008F BODY MASS INDEX DOCD: CPT | Performed by: NURSE PRACTITIONER
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
## 2024-01-17 ENCOUNTER — TELEPHONE (OUTPATIENT)
Dept: RHEUMATOLOGY | Facility: CLINIC | Age: 64
End: 2024-01-17

## 2024-01-17 ENCOUNTER — HOSPITAL ENCOUNTER (OUTPATIENT)
Dept: MRI IMAGING | Age: 64
Discharge: HOME | End: 2024-01-17
Attending: INTERNAL MEDICINE
Payer: COMMERCIAL

## 2024-01-17 DIAGNOSIS — M19.90 OSTEOARTHRITIS, UNSPECIFIED OSTEOARTHRITIS TYPE, UNSPECIFIED SITE: ICD-10-CM

## 2024-01-17 DIAGNOSIS — R76.8 ANA POSITIVE: ICD-10-CM

## 2024-01-17 PROCEDURE — 72148 MRI LUMBAR SPINE W/O DYE: CPT | Performed by: INTERNAL MEDICINE

## 2024-01-17 NOTE — TELEPHONE ENCOUNTER
Spoke with patient. She states she has an appointment with pain management scheduled. She will wait to see what they say and consider physical therapy. She will call us back when she decides.    Future Appointments   Date Time Provider Department Center   1/19/2024  9:00 AM Davin Garibay PA ENIPain [facility] [place]   1/23/2024  9:30 AM [facility] LOM[facility]HIWLC [facility]   1/31/2024 10:00 AM [facility]N LOMGWDBHI [id number]   3/7/2024  9:40 AM Dunia Shelton MD [id number] [facility] [address]   4/29/2024  8:40 AM Marcela Alvarado APRN EMGWEI [id number]

## 2024-01-17 NOTE — TELEPHONE ENCOUNTER
FINDINGS:   Mild rightward curvature.  There is normal lumbar lordosis with anatomic alignment.  Vertebral body heights are well-maintained.  Moderate degenerative disc space loss, disc desiccation, endplate spurring, and degenerative endplate marrow signal changes   scattered in the lumbar spine.  Scattered vacuum disc phenomenon noted.  No focal worrisome marrow signal abnormality is seen.  Scattered areas of focal fat and/or hemangiomas noted in the visualized marrow space.      The distal spinal cord and conus medullaris have a normal signal and morphology.  The conus medullaris terminates at the lower L1 level.  The roots of the cauda equina are unremarkable.  No focal mass or fluid collection is seen in the lumbar spinal   canal.  The paraspinal soft tissues are unremarkable.      T12-L1:  Minimal diffuse disc bulge with endplate osteophytes.  Mild facet arthropathy. There is no significant spinal canal or neural foraminal stenosis.      L1-2:  Mild diffuse disc bulge with a small superimposed right paracentral disc protrusion.  Mild facet arthropathy. There is no significant spinal canal or neural foraminal stenosis.      L2-3:  Diffuse disc bulge with a small superimposed left foraminal disc protrusion.  Mild facet arthropathy.  No significant spinal canal stenosis.  Mild bilateral neural foraminal stenosis.      L3-4:  Diffuse disc bulge with mild facet arthropathy. There is no significant spinal canal or neural foraminal stenosis.      L4-5:  Diffuse disc bulge with mild-to-moderate facet arthropathy, right greater than left. There is no significant spinal canal or neural foraminal stenosis.      L5-S1:  Minimal diffuse disc bulge with moderate facet arthropathy.  Small bilateral facet joint effusions. There is no significant spinal canal or neural foraminal stenosis.     MRI of the lumbar spine as suspected shows diffuse disc bulges throughout the lumbar spine as above    As recommended in clinic would  see pain management if she does not have 1 already    Already started her on gabapentin    Could consider physical therapy if she would like as well.  Let me know what she decides    She is local Dr. Bourgeois is fine

## 2024-01-19 ENCOUNTER — OFFICE VISIT (OUTPATIENT)
Dept: PAIN CLINIC | Facility: CLINIC | Age: 64
End: 2024-01-19
Payer: COMMERCIAL

## 2024-01-19 VITALS — SYSTOLIC BLOOD PRESSURE: 126 MMHG | HEART RATE: 71 BPM | DIASTOLIC BLOOD PRESSURE: 80 MMHG | OXYGEN SATURATION: 96 %

## 2024-01-19 DIAGNOSIS — M47.816 LUMBAR SPONDYLOSIS: Primary | ICD-10-CM

## 2024-01-19 PROCEDURE — 99214 OFFICE O/P EST MOD 30 MIN: CPT | Performed by: PHYSICIAN ASSISTANT

## 2024-01-19 PROCEDURE — 3079F DIAST BP 80-89 MM HG: CPT | Performed by: PHYSICIAN ASSISTANT

## 2024-01-19 PROCEDURE — 3074F SYST BP LT 130 MM HG: CPT | Performed by: PHYSICIAN ASSISTANT
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
## 2024-01-19 NOTE — PROGRESS NOTES
Patient presents in office today with reported pain in lower back    Current pain level reported = 6/10    Last reported dose of NA today

## 2024-01-19 NOTE — PATIENT INSTRUCTIONS
Refill policies:    Allow 2-3 business days for refills; controlled substances may take longer.  Contact your pharmacy at least 5 days prior to running out of medication and have them send an electronic request or submit request through the “request refill” option in your [employer] account.  Refills are not addressed on weekends; covering physicians do not authorize routine medications on weekends.  No narcotics or controlled substances are refilled after noon on Fridays or by on call physicians.  By law, narcotics must be electronically prescribed.  A 30 day supply with no refills is the maximum allowed.  If your prescription is due for a refill, you may be due for a follow up appointment.  To best provide you care, patients receiving routine medications need to be seen at least once a year.  Patients receiving narcotic/controlled substance medications need to be seen at least once every 3 months.  In the event that your preferred pharmacy does not have the requested medication in stock (e.g. Backordered), it is your responsibility to find another pharmacy that has the requested medication available.  We will gladly send a new prescription to that pharmacy at your request.    Scheduling Tests:    If your physician has ordered radiology tests such as MRI or CT scans, please contact Central Scheduling at [phone] right away to schedule the test.  Once scheduled, the [facility] Centralized Referral Team will work with your insurance carrier to obtain pre-certification or prior authorization.  Depending on your insurance carrier, approval may take 3-10 days.  It is highly recommended patients assure they have received an authorization before having a test performed.  If test is done without insurance authorization, patient may be responsible for the entire amount billed.      Precertification and Prior Authorizations:  If your physician has recommended that you have a procedure or additional testing performed the [facility]  Centralized Referral Team will contact your insurance carrier to obtain pre-certification or prior authorization.    You are strongly encouraged to contact your insurance carrier to verify that your procedure/test has been approved and is a COVERED benefit.  Although the [facility] Centralized Referral Team does its due diligence, the insurance carrier gives the disclaimer that \"Although the procedure is authorized, this does not guarantee payment.\"    Ultimately the patient is responsible for payment.   Thank you for your understanding in this matter.  Paperwork Completion:  If you require FMLA or disability paperwork for your recovery, please make sure to either drop it off or have it faxed to our office at [phone]. Be sure the form has your name and date of birth on it.  The form will be faxed to our Forms Department and they will complete it for you.  There is a 25$ fee for all forms that need to be filled out.  Please be aware there is a 10-14 day turnaround time.  You will need to sign a release of information (CECE) form if your paperwork does not come with one.  You may call the Forms Department with any questions at [phone].  Their fax number is [phone].

## 2024-01-19 NOTE — PROGRESS NOTES
Location of Pain: lower back, right shoulder    Date Pain Began: 5+ years          Work Related:   No        Receiving Work Comp/Disability:   No    Numeric Rating Scale:  Pain at Present:  6                                                                                                            (No Pain) 0  to  10 (Worst Pain)                 Minimum Pain:   6  Maximum Pain  10    Distribution of Pain:    bilateral    Quality of Pain:   aching    Origin of Pain:    Degenerative    Aggravating Factors:    Sitting and Other Bending    Past Treatments for Current Pain Condition:   Other Tylenol, Heat pad    Prior diagnostic testing for your pain:  MRI, Xray

## 2024-03-07 ENCOUNTER — OFFICE VISIT (OUTPATIENT)
Dept: RHEUMATOLOGY | Facility: CLINIC | Age: 64
End: 2024-03-07
Payer: COMMERCIAL

## 2024-03-07 VITALS
TEMPERATURE: 98 F | WEIGHT: 241 LBS | SYSTOLIC BLOOD PRESSURE: 142 MMHG | BODY MASS INDEX: 41 KG/M2 | RESPIRATION RATE: 16 BRPM | OXYGEN SATURATION: 98 % | HEART RATE: 89 BPM | DIASTOLIC BLOOD PRESSURE: 68 MMHG

## 2024-03-07 DIAGNOSIS — M85.80 OSTEOPENIA, UNSPECIFIED LOCATION: ICD-10-CM

## 2024-03-07 DIAGNOSIS — M19.90 OSTEOARTHRITIS, UNSPECIFIED OSTEOARTHRITIS TYPE, UNSPECIFIED SITE: Primary | ICD-10-CM

## 2024-03-07 DIAGNOSIS — M79.7 FIBROMYALGIA: ICD-10-CM

## 2024-03-07 PROBLEM — M47.816 DEGENERATIVE JOINT DISEASE (DJD) OF LUMBAR SPINE: Status: ACTIVE | Noted: 2024-03-07

## 2024-03-07 PROCEDURE — 3077F SYST BP >= 140 MM HG: CPT | Performed by: INTERNAL MEDICINE

## 2024-03-07 PROCEDURE — 3078F DIAST BP <80 MM HG: CPT | Performed by: INTERNAL MEDICINE

## 2024-03-07 PROCEDURE — 99214 OFFICE O/P EST MOD 30 MIN: CPT | Performed by: INTERNAL MEDICINE

## 2024-03-07 RX ORDER — SPIRONOLACTONE 25 MG/1
25 TABLET ORAL DAILY
COMMUNITY
Start: 2024-03-01 | End: 2025-03-01

## 2024-03-07 RX ORDER — GABAPENTIN 300 MG/1
300 CAPSULE ORAL NIGHTLY
Qty: 90 CAPSULE | Refills: 1 | Status: SHIPPED | OUTPATIENT
Start: 2024-03-07

## 2024-03-07 RX ORDER — HYDRALAZINE HYDROCHLORIDE 50 MG/1
50 TABLET, FILM COATED ORAL 2 TIMES DAILY
COMMUNITY
Start: 2024-03-01

## 2024-03-07 NOTE — PATIENT INSTRUCTIONS
OSTEOARTHRITIS    Fast Facts    Though some of the joint changes are irreversible, most patients will not need joint replacement surgery.    OA symptoms (what you feel) can vary greatly among patients.    A rheumatologist can detect arthritis and prescribe the proper treatment. The goal of treatment in OA is to reduce pain and improve function.    Exercise is an important part of OA treatment, because it can decrease joint pain and improve function.    At present, there is no treatment that can reverse the damage of OA in the joints. Researchers are trying to find ways to slow or reverse this joint damage.    Osteoarthritis (also known as OA) is a common joint disease that most often affects middle-age to elderly people. It is commonly referred to as \"wear and tear\" of the joints, but we now know that OA is a disease of the entire joint, involving the cartilage, joint lining, ligaments, and bone. Although it is more common in older people, it is not really accurate to say that the joints are just \"wearing out.\" It is characterized by breakdown of the cartilage (the tissue that cushions the ends of the bones between joints), bony changes of the joints, deterioration of tendons and ligaments, and various degrees of inflammation of the joint lining (called the synovium).    This arthritis tends to occur in the hand joints, spine, hips, knees, and great toes. The lifetime risk of developing OA of the knee is about 46%, and the lifetime risk of developing OA of the hip is 25%, according to the [facility] acute [facility] Osteoarthritis Project, a long-term study from the [facility] and sponsored by the Centers for Disease Control and Prevention (often called the CDC) and the National Institutes of Health.    OA is a top cause of disability in older people. The goal of osteoarthritis treatment is to reduce pain and improve function. There is no cure for the disease, but some treatments attempt to slow disease  progression.         What is osteoarthritis?    OA is a frequently slowly progressive joint disease typically seen in middle-aged to elderly people. In osteoarthritis, the cartilage between the bones in the joint breaks down. This causes the affected bones to slowly get bigger. The joint cartilage often breaks down because of mechanical stress or biochemical changes within the body, causing the bone underneath to fail. OA can occur together with other types of arthritis, such as gout or rheumatoid arthritis.    OA tends to affect commonly used joints such as the hands and spine, and the weight-bearing joints such as the hips and knees. Symptoms include:    Joint pain and stiffness    Knobby swelling at the joint    Cracking or grinding noise with joint movement    Decreased function of the joint    How do you treat osteoarthritis?    There is no proven treatment yet that can reverse joint damage from OA. The goal of osteoarthritis treatment is to reduce pain and improve function of the affected joints. Most often, this is possible with a mixture of physical measures and drug therapy and, sometimes, surgery.    Physical measures: Weight loss and exercise are useful in OA. Excess weight puts stress on your knee joints and hips and low back. For every 10 pounds of weight you lose over 10 years, you can reduce the chance of developing knee OA by up to 50 percent. Exercise can improve your muscle strength, decrease joint pain and stiffness, and lower the chance of disability due to OA. Also helpful are support (\"assistive\") devices, such as orthotics or a walking cane, that help you do daily activities. Heat or cold therapy can help relieve OA symptoms for a short time.    Certain alternative treatments such as spa (hot tub), massage, and chiropractic manipulation can help relieve pain for a short time. They can be costly, though, and require repeated treatments. Also, the long-term benefits of these alternative  (sometimes called complementary or integrative) medicine treatments are unproven but are under study.    Drug therapy: Forms of drug therapy include topical, oral (by mouth) and injections (shots). You apply topical drugs directly on the skin over the affected joints. These medicines include capsaicin cream, lidocaine and diclofenac gel. Oral pain relievers such as acetaminophen are common first treatments. So are nonsteroidal anti-inflammatory drugs (often called NSAIDs), which decrease swelling and pain.    In 2010, the government (FDA) approved the use of duloxetine (Cymbalta) for chronic (long-term) musculoskeletal pain including from OA. This oral drug is not new. It also is in use for other health concerns, such as mood disorders, nerve pain and fibromyalgia.    Patients with more serious pain may need stronger medications, such as prescription narcotics.    Joint injections with corticosteroids (sometimes called cortisone shots) or with a form of lubricant called hyaluronic acid can give months of pain relief from OA. This lubricant is given in the knee, and these shots may help delay the need for a knee replacement by a few years in some patients.    Surgery: Surgical treatment becomes an option for severe cases. This includes when the joint has serious damage, or when medical treatment fails to relieve pain and you have major loss of function. Surgery may involve arthroscopy, repair of the joint done through small incisions (cuts). If the joint damage cannot be repaired, you may need a joint replacement.    Supplements: Many over-the-counter nutrition supplements have been used for osteoarthritis treatment. Most lack good research data to support their effectiveness and safety. Among the most widely used are calcium, vitamin D and omega-3 fatty acids. To ensure safety and avoid drug interactions, consult your doctor or pharmacist before using any of these supplements. This is especially true when you are  combining these supplements with prescribed
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
## 2024-06-05 PROBLEM — N18.30 CHRONIC RENAL INSUFFICIENCY, STAGE III (MODERATE) (HCC): Status: ACTIVE | Noted: 2023-04-13

## 2024-06-05 PROBLEM — I89.0 LYMPHEDEMA OF LIMB: Status: ACTIVE | Noted: 2023-11-02

## 2024-06-05 PROBLEM — I65.23 BILATERAL CAROTID ARTERY STENOSIS: Status: ACTIVE | Noted: 2023-11-02

## 2024-06-05 PROBLEM — I16.0 HYPERTENSIVE URGENCY: Status: ACTIVE | Noted: 2024-02-27

## 2024-06-05 PROBLEM — M70.61 TROCHANTERIC BURSITIS, RIGHT HIP: Status: ACTIVE | Noted: 2018-10-29

## 2024-06-05 PROBLEM — I47.10 PAROXYSMAL SUPRAVENTRICULAR TACHYCARDIA (HCC): Status: ACTIVE | Noted: 2024-02-27

## 2024-06-05 PROBLEM — G47.33 OBSTRUCTIVE SLEEP APNEA SYNDROME: Status: ACTIVE | Noted: 2023-04-13

## 2024-06-05 PROBLEM — R60.0 EDEMA OF LEFT LOWER EXTREMITY: Status: ACTIVE | Noted: 2023-11-02

## 2024-06-05 PROBLEM — I99.8 VASCULAR INSUFFICIENCY: Status: ACTIVE | Noted: 2023-11-02

## 2024-06-05 PROBLEM — R60.9 EDEMA: Status: ACTIVE | Noted: 2023-04-13

## 2024-06-05 PROBLEM — I25.10 CORONARY ARTERIOSCLEROSIS: Status: ACTIVE | Noted: 2023-04-13

## 2024-08-27 DIAGNOSIS — M79.7 FIBROMYALGIA: Primary | ICD-10-CM

## 2024-08-27 RX ORDER — GABAPENTIN 300 MG/1
300 CAPSULE ORAL NIGHTLY
Qty: 90 CAPSULE | Refills: 3 | Status: SHIPPED | OUTPATIENT
Start: 2024-08-27

## 2024-08-27 NOTE — TELEPHONE ENCOUNTER
Last office visit: 3/7/2024    Next Rheum Apt:9/26/2024 Dunia Shelton MD    Last fill: 3/7/2024  90 capsules, 1 refill     Labs:   Lab Results   Component Value Date    CREATSERUM 1.70 (H) 10/20/2022    GFR 90 12/16/2016    ALKPHO 105 10/20/2022    AST 19 10/20/2022    ALT 30 10/20/2022    BILT 0.4 10/20/2022    TP 7.6 10/20/2022    ALB 4.1 10/20/2022       Lab Results   Component Value Date    WBC 13.4 (H) 10/20/2022    HGB 13.4 10/20/2022    .0 10/20/2022    NEPRELIM 10.44 (H) 10/20/2022    NEPERCENT 78.3 10/20/2022    LYPERCENT 12.5 10/20/2022    NE 10.44 (H) 10/20/2022    LYMABS 1.67 10/20/2022

## 2024-09-26 ENCOUNTER — OFFICE VISIT (OUTPATIENT)
Dept: RHEUMATOLOGY | Facility: CLINIC | Age: 64
End: 2024-09-26
Payer: COMMERCIAL

## 2024-09-26 VITALS
HEIGHT: 64 IN | TEMPERATURE: 97 F | WEIGHT: 232.81 LBS | OXYGEN SATURATION: 97 % | DIASTOLIC BLOOD PRESSURE: 62 MMHG | SYSTOLIC BLOOD PRESSURE: 120 MMHG | BODY MASS INDEX: 39.75 KG/M2 | HEART RATE: 67 BPM | RESPIRATION RATE: 16 BRPM

## 2024-09-26 DIAGNOSIS — N18.30 CHRONIC RENAL IMPAIRMENT, STAGE 3 (MODERATE), UNSPECIFIED WHETHER STAGE 3A OR 3B CKD (HCC): Primary | ICD-10-CM

## 2024-09-26 DIAGNOSIS — E66.01 CLASS 3 SEVERE OBESITY DUE TO EXCESS CALORIES WITH SERIOUS COMORBIDITY AND BODY MASS INDEX (BMI) OF 40.0 TO 44.9 IN ADULT (HCC): ICD-10-CM

## 2024-09-26 DIAGNOSIS — K58.1 IRRITABLE BOWEL SYNDROME WITH CONSTIPATION: ICD-10-CM

## 2024-09-26 DIAGNOSIS — K21.00 GASTROESOPHAGEAL REFLUX DISEASE WITH ESOPHAGITIS WITHOUT HEMORRHAGE: ICD-10-CM

## 2024-09-26 DIAGNOSIS — R76.8 ANA POSITIVE: ICD-10-CM

## 2024-09-26 DIAGNOSIS — I73.9 PVD (PERIPHERAL VASCULAR DISEASE) (HCC): ICD-10-CM

## 2024-09-26 DIAGNOSIS — M15.0 PRIMARY OSTEOARTHRITIS INVOLVING MULTIPLE JOINTS: ICD-10-CM

## 2024-09-26 DIAGNOSIS — M85.89 OSTEOPENIA OF MULTIPLE SITES: ICD-10-CM

## 2024-09-26 DIAGNOSIS — Z51.81 ENCOUNTER FOR THERAPEUTIC DRUG MONITORING: ICD-10-CM

## 2024-09-26 DIAGNOSIS — M79.7 FIBROMYALGIA: ICD-10-CM

## 2024-09-26 DIAGNOSIS — I47.10 PAROXYSMAL SUPRAVENTRICULAR TACHYCARDIA (HCC): ICD-10-CM

## 2024-09-26 DIAGNOSIS — M48.062 LUMBAR STENOSIS WITH NEUROGENIC CLAUDICATION: ICD-10-CM

## 2024-09-26 PROCEDURE — 3008F BODY MASS INDEX DOCD: CPT | Performed by: INTERNAL MEDICINE

## 2024-09-26 PROCEDURE — 99214 OFFICE O/P EST MOD 30 MIN: CPT | Performed by: INTERNAL MEDICINE

## 2024-09-26 PROCEDURE — 3074F SYST BP LT 130 MM HG: CPT | Performed by: INTERNAL MEDICINE

## 2024-09-26 PROCEDURE — 3078F DIAST BP <80 MM HG: CPT | Performed by: INTERNAL MEDICINE

## 2024-09-26 RX ORDER — GABAPENTIN 300 MG/1
300 CAPSULE ORAL NIGHTLY
Qty: 90 CAPSULE | Refills: 3 | Status: SHIPPED | OUTPATIENT
Start: 2024-09-26

## 2024-09-26 RX ORDER — GABAPENTIN 300 MG/1
300 CAPSULE ORAL NIGHTLY
Qty: 90 CAPSULE | Refills: 3 | Status: SHIPPED | OUTPATIENT
Start: 2024-09-26 | End: 2024-09-26

## 2024-09-26 RX ORDER — BUSPIRONE HYDROCHLORIDE 5 MG/1
5 TABLET ORAL 2 TIMES DAILY
COMMUNITY
Start: 2024-07-02

## 2024-09-26 NOTE — PROGRESS NOTES
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  Status post cholecystectomy.                   Impression   CONCLUSION:    1. Degenerative change as detailed above.        LOCATION:  [facility]        Dictated by (CST): Ev Bruno MD on 12/13/2023 at 11:22 AM      Finalized by (CST): Ev Bruno MD on 12/13/2023 at 11:25 AM       Result History  12/13/2023, 8:51 AM.     INDICATIONS:  Z96.652 History of left knee replacement M19.90 Osteoarthritis, unspecified osteoarthritis type, unspecified site R76.8 LINA positive     PATIENT STATED HISTORY: (As transcribed by Technologist)  Patient has all over joint pain.  Worst pain is lower back/pelvis area.         FINDINGS:      BONES:  Lateral projection demonstrates mild multilevel subluxation most likely degenerative in nature.  Vertebral body height maintained.  Multilevel disc space narrowing with marginal osteophytes is most consistent with degenerative change.  Multilevel   facet joint arthropathy.                   Impression   CONCLUSION:    1. Degenerative change.        LOCATION:  [facility]        Dictated by (CST): Ev Bruno MD on 12/13/2023 at 11:25 A     Narrative   PROCEDURE:  MRI SPINE LUMBAR (CPT=72148)     COMPARISON:  None.     INDICATIONS:  R76.8 LINA positive M19.90 Osteoarthritis, unspecified osteoarthritis type, unspecified  site     TECHNIQUE:  Multiplanar T1 and T2 weighted images including fat suppression sequences.  Images acquired in sagittal and axial planes.       PATIENT STATED HISTORY: (As transcribed by Technologist)  Patient states lower back pain.      FINDINGS:  Mild rightward curvature.  There is normal lumbar lordosis with anatomic alignment.  Vertebral body heights are well-maintained.  Moderate degenerative disc space loss, disc desiccation, endplate spurring, and degenerative endplate marrow signal changes  scattered in the lumbar spine.  Scattered vacuum disc phenomenon noted.  No focal worrisome marrow signal abnormality is seen.  Scattered areas of focal fat and/or hemangiomas noted in the visualized marrow space.     The distal spinal cord and conus medullaris have a normal signal and morphology.  The conus medullaris terminates at the lower L1 level.  The roots of the cauda equina are unremarkable.  No focal mass or fluid collection is seen in the lumbar spinal  canal.  The paraspinal soft tissues are unremarkable.     T12-L1:  Minimal diffuse disc bulge with endplate osteophytes.  Mild facet arthropathy. There is no significant spinal canal or neural foraminal stenosis.     L1-2:  Mild diffuse disc bulge with a small superimposed right paracentral disc protrusion.  Mild facet arthropathy. There is no significant spinal canal or neural foraminal stenosis.     L2-3:  Diffuse disc bulge with a small superimposed left foraminal disc protrusion.  Mild facet arthropathy.  No significant spinal canal stenosis.  Mild bilateral neural foraminal stenosis.     L3-4:  Diffuse disc bulge with mild facet arthropathy. There is no significant spinal canal or neural foraminal stenosis.     L4-5:  Diffuse disc bulge with mild-to-moderate facet arthropathy, right greater than left. There is no significant spinal canal or neural foraminal stenosis.     L5-S1:  Minimal diffuse disc bulge with moderate facet arthropathy.  Small  bilateral facet joint effusions. There is no significant spinal canal or neural foraminal stenosis.                   Impression   CONCLUSION:       1. Multilevel degenerative changes lumbar spine as above without significant spinal canal stenosis at any level.     2. Mild bilateral neural foraminal stenosis at L2-3.     3. Facet arthropathy throughout the lumbar spine, most pronounced at L5-S1.     Please see above for further details.     LOCATION:  [facility]         Result History    XR PELVIS (COMPLETE MIN 3 VIEWS) (CPT=72190) (Order #[id number]) on 12/13/2023 - Order Result History Report     FINDINGS:    Left total knee prosthesis in place.  No fracture or dislocation.       Assessment & Plan:      63-year-old woman comes in for further evaluation for polyarthralgias with incidental positive LINA 1-640    Positive LINA without clear history concerning for connective tissue disease  Fibromyalgia with multiple tender points nonrestorative sleep fatigue neuropathy; insomnia questionable IBS-C  Moderate osteoarthritis of the right knee  Lumbar cervical degenerative arthritis stenosis disc bulges seen by pain management  Osteoarthritis of the hips mild  Osteopenia with low fracture risk December 2023    With previous normal ESR CRP no signs or symptoms concerning for PMR  No overt weakness on exam  Recent autoimmune testing with specific serologies otherwise normal other than positive LINA  Extensive autoimmune workup normal  CBC and CMP normal  Would recommend physical therapy for strengthening and stretching  She has chronic trochanteric bursitis seen by Ortho as well along with her chronic shoulder issues and AC joint arthritis  She cannot tolerate NSAIDs was previously on meloxicam but had stomach issues and stopped  Continue gabapentin 300 mg at bedtime  She is aware of diagnosis of fibromyalgia in detail written information given to the patient  Encouraged stress management better sleep and low intensity  exercise  Risks and side effects of gabapentin discussed    She had seen pain management with no further intervention from a pain perspective she is doing well at this time    Reviewed x-rays MRI with patient she states if her pain worsens she will go back to pain management she does have 1 established at this time    Patient has a history of hysterectomy bone density reviewed from December 2023 showing osteopenia with low fracture at advised calcium at least 1200 mg if no contraindications vitamin D at least 3 to 4000 IU D3    Discussed with patient to follow-up with PCP and likely pain management in between visits for management of chronic pain with her pain specialist that we will refer her to who she has seen    Other options that could be used in the future through her PCP include Cymbalta amitriptyline nortriptyline Lyrica; increasing trazodone through them for better management of her insomnia or referral to psychiatrist      Education and counseling provided to patient.  Instructed patient to call my office or seek medical attention immediately if symptoms worsen. Risks and side effects of medications and diagnosis discussed in detail and patient was given written information on new prescribed medications.    Return to clinic:  Return in about 6 months (around 3/26/2025).    Dunia Shelton MD  12/6/2023

## 2024-10-10 ENCOUNTER — HOSPITAL ENCOUNTER (OUTPATIENT)
Dept: ULTRASOUND IMAGING | Age: 64
Discharge: HOME | End: 2024-10-10
Attending: FAMILY MEDICINE
Payer: COMMERCIAL

## 2024-10-10 DIAGNOSIS — R74.8 ELEVATED ALKALINE PHOSPHATASE LEVEL: ICD-10-CM

## 2024-10-10 PROCEDURE — 76700 US EXAM ABDOM COMPLETE: CPT | Performed by: FAMILY MEDICINE

## 2024-10-22 ENCOUNTER — NURSE ONLY (OUTPATIENT)
Dept: LAB | Facility: HOSPITAL | Age: 64
End: 2024-10-22
Attending: FAMILY MEDICINE
Payer: COMMERCIAL

## 2024-10-22 ENCOUNTER — HOSPITAL ENCOUNTER (OUTPATIENT)
Dept: ULTRASOUND IMAGING | Facility: HOSPITAL | Age: 64
Discharge: HOME | End: 2024-10-22
Attending: FAMILY MEDICINE
Payer: COMMERCIAL

## 2024-10-22 VITALS
WEIGHT: 228 LBS | DIASTOLIC BLOOD PRESSURE: 59 MMHG | OXYGEN SATURATION: 99 % | SYSTOLIC BLOOD PRESSURE: 127 MMHG | TEMPERATURE: 98 F | HEIGHT: 64 IN | RESPIRATION RATE: 12 BRPM | HEART RATE: 52 BPM | BODY MASS INDEX: 38.93 KG/M2

## 2024-10-22 DIAGNOSIS — R74.8 ELEVATED ALKALINE PHOSPHATASE LEVEL: ICD-10-CM

## 2024-10-22 DIAGNOSIS — R74.8 ELEVATED ALKALINE PHOSPHATASE LEVEL: Primary | ICD-10-CM

## 2024-10-22 LAB
ALBUMIN SERPL-MCNC: 4.8 G/DL (ref 3.2–4.8)
ALP LIVER SERPL-CCNC: 128 U/L
ALT SERPL-CCNC: 17 U/L
AST SERPL-CCNC: 22 U/L (ref ?–34)
BILIRUB DIRECT SERPL-MCNC: 0.2 MG/DL (ref ?–0.3)
BILIRUB SERPL-MCNC: 0.7 MG/DL (ref 0.2–1.1)
ERYTHROCYTE [DISTWIDTH] IN BLOOD BY AUTOMATED COUNT: 12.4 %
HCT VFR BLD AUTO: 37.8 %
HGB BLD-MCNC: 13.3 G/DL
INR BLD: 0.99 (ref 0.8–1.2)
MCH RBC QN AUTO: 31.4 PG (ref 26–34)
MCHC RBC AUTO-ENTMCNC: 35.2 G/DL (ref 31–37)
MCV RBC AUTO: 89.4 FL
PLATELET # BLD AUTO: 209 10(3)UL (ref 150–450)
PROT SERPL-MCNC: 7.8 G/DL (ref 5.7–8.2)
PROTHROMBIN TIME: 13.2 SECONDS (ref 11.6–14.8)
RBC # BLD AUTO: 4.23 X10(6)UL
WBC # BLD AUTO: 5.3 X10(3) UL (ref 4–11)

## 2024-10-22 PROCEDURE — 85027 COMPLETE CBC AUTOMATED: CPT

## 2024-10-22 PROCEDURE — 85610 PROTHROMBIN TIME: CPT

## 2024-10-22 PROCEDURE — 83516 IMMUNOASSAY NONANTIBODY: CPT

## 2024-10-22 PROCEDURE — 88313 SPECIAL STAINS GROUP 2: CPT | Performed by: FAMILY MEDICINE

## 2024-10-22 PROCEDURE — 84080 ASSAY ALKALINE PHOSPHATASES: CPT

## 2024-10-22 PROCEDURE — 36415 COLL VENOUS BLD VENIPUNCTURE: CPT

## 2024-10-22 PROCEDURE — 99152 MOD SED SAME PHYS/QHP 5/>YRS: CPT | Performed by: FAMILY MEDICINE

## 2024-10-22 PROCEDURE — 80076 HEPATIC FUNCTION PANEL: CPT

## 2024-10-22 PROCEDURE — 76942 ECHO GUIDE FOR BIOPSY: CPT | Performed by: FAMILY MEDICINE

## 2024-10-22 PROCEDURE — 47000 NEEDLE BIOPSY OF LIVER PERQ: CPT | Performed by: FAMILY MEDICINE

## 2024-10-22 PROCEDURE — 88307 TISSUE EXAM BY PATHOLOGIST: CPT | Performed by: FAMILY MEDICINE

## 2024-10-22 PROCEDURE — 84075 ASSAY ALKALINE PHOSPHATASE: CPT

## 2024-10-22 RX ORDER — NALOXONE HYDROCHLORIDE 0.4 MG/ML
INJECTION, SOLUTION INTRAMUSCULAR; INTRAVENOUS; SUBCUTANEOUS
Status: DISCONTINUED
Start: 2024-10-22 | End: 2024-10-22 | Stop reason: WASHOUT

## 2024-10-22 RX ORDER — SODIUM CHLORIDE 9 MG/ML
INJECTION, SOLUTION INTRAVENOUS CONTINUOUS
Status: DISCONTINUED | OUTPATIENT
Start: 2024-10-22 | End: 2024-10-24

## 2024-10-22 RX ORDER — FLUMAZENIL 0.1 MG/ML
INJECTION INTRAVENOUS
Status: DISCONTINUED
Start: 2024-10-22 | End: 2024-10-22 | Stop reason: WASHOUT

## 2024-10-22 RX ORDER — MIDAZOLAM HYDROCHLORIDE 1 MG/ML
INJECTION INTRAMUSCULAR; INTRAVENOUS
Status: COMPLETED
Start: 2024-10-22 | End: 2024-10-22

## 2024-10-22 RX ORDER — MIDAZOLAM HYDROCHLORIDE 1 MG/ML
1 INJECTION INTRAMUSCULAR; INTRAVENOUS EVERY 5 MIN PRN
Status: DISCONTINUED | OUTPATIENT
Start: 2024-10-22 | End: 2024-10-24

## 2024-10-22 RX ORDER — NALOXONE HYDROCHLORIDE 0.4 MG/ML
0.08 INJECTION, SOLUTION INTRAMUSCULAR; INTRAVENOUS; SUBCUTANEOUS AS NEEDED
Status: DISCONTINUED | OUTPATIENT
Start: 2024-10-22 | End: 2024-10-24

## 2024-10-22 RX ORDER — FLUMAZENIL 0.1 MG/ML
0.2 INJECTION INTRAVENOUS AS NEEDED
Status: DISCONTINUED | OUTPATIENT
Start: 2024-10-22 | End: 2024-10-24

## 2024-10-22 RX ADMIN — SODIUM CHLORIDE: 9 INJECTION, SOLUTION INTRAVENOUS at 08:07:00

## 2024-10-22 RX ADMIN — MIDAZOLAM HYDROCHLORIDE 1 MG: 1 INJECTION INTRAMUSCULAR; INTRAVENOUS at 08:10:00

## 2024-10-22 RX ADMIN — MIDAZOLAM HYDROCHLORIDE 1 MG: 1 INJECTION INTRAMUSCULAR; INTRAVENOUS at 08:12:00

## 2024-10-22 NOTE — IMAGING NOTE
Pt post liver biopsy. Tolerated procedure and sedation well. Denies any pain. Biospy site Lateral right upper abdomen CDI with tegaderm.  Alert and oriented. Report called to Chelsy in [facility] and bed assigned.

## 2024-10-22 NOTE — DISCHARGE INSTRUCTIONS
Discharge/After Visit [facility] [place] - Department of Radiology  Biopsy - Renal (Kidney), Liver, Lung, Bone, Retroperitoneal Location    Post Sedation  Follow these guidelines:  You should be watched overnight by a responsible adult. This person should make sure your condition remains stable.   Do not drink any alcohol for 24 hours after the procedure.  Don’t drive, operate dangerous machinery, make important business or personal decisions, or sign legal documents for 24 hours after the procedure.  Note: Your healthcare provider may tell you not to take any medicine by mouth for pain or sleep for a period of time. These medicines may react with the medicine you were given during your procedure. This could cause a much stronger response than usual.     Activity:  Take it easy for the rest of the day after your biopsy.   You may be sore at the site of the biopsy for the next 5 days.   Do not do any strenuous exercises or lift over five pounds for the next 24 hours.     Biopsy Site:  Keep a bandage on the biopsy site for 24 hours after the procedure.   You may shower after 24 hours, but no soaking in a bathtub for 48 hours.     Lung Biopsy: If chest pain or shortness of breath occurs, go to the nearest Emergency Room.   Liver Biopsy: If there is any increase in right-sided back, shoulder, or abdominal pain, go to the nearest Emergency Room. Call your doctor for unusual dizziness or weakness.   Renal Biopsy: Report any bloody urine, bleeding at the site, swelling, or pain to your ordering provider,      Diet: Drink plenty of fluids and resume your usual home diet. A slow return to normal foods minimizes nausea.    Pain Management:   You may use over-the-counter or prescribed pain relief medication if it is not contraindicated by another condition.     Medications:  You may resume your usual home medications. You may resume blood thinners 24 hours after the procedure if there is no bleeding from/hematoma at  the puncture site or bloody urine (Renal Biopsy only)     When to seek medical advice:  Call the provider that ordered the biopsy with any questions and to discuss test results. These may also be available in your [facility] My Chart. You may also contact the Radiology Nurse at [phone], M-F, 8-5 with any additional questions or concerns.  Dial [phone] and ask the  to page the on-call IR Radiologist if any of these occur:    A change in color or temperature of the area where the biopsy was performed.  You develop increasing pain or shortness of breath.  Unusual drowsiness, weakness, or dizziness.  Unusual vomiting.     IF YOU FEEL YOU ARE EXPERIENCING AN EMERGENCY,   CALL 911 OR GO TO THE NEAREST EMERGENCY ROOM      4.2.24 MO/  Radiology

## 2024-10-23 LAB
ACTIN SMOOTH MUSCLE AB: 13 UNITS
ALK PHOSPHATASE: 128 IU/L
BONE FRAC: 32 %
INTESTINAL FRAC: 5 %
LIVER FRAC: 63 %
M2 MITOCHONDRIAL AB: 48.3 UNITS

## 2025-02-10 RX ORDER — GABAPENTIN 100 MG/1
CAPSULE ORAL
Qty: 90 CAPSULE | Refills: 3 | OUTPATIENT
Start: 2025-02-10

## 2025-03-26 ENCOUNTER — OFFICE VISIT (OUTPATIENT)
Dept: RHEUMATOLOGY | Facility: CLINIC | Age: 65
End: 2025-03-26
Payer: MEDICARE

## 2025-03-26 VITALS
HEART RATE: 71 BPM | RESPIRATION RATE: 16 BRPM | WEIGHT: 232 LBS | SYSTOLIC BLOOD PRESSURE: 120 MMHG | TEMPERATURE: 97 F | HEIGHT: 64.5 IN | DIASTOLIC BLOOD PRESSURE: 60 MMHG | BODY MASS INDEX: 39.12 KG/M2 | OXYGEN SATURATION: 97 %

## 2025-03-26 DIAGNOSIS — Z51.81 ENCOUNTER FOR THERAPEUTIC DRUG MONITORING: Primary | ICD-10-CM

## 2025-03-26 DIAGNOSIS — M15.0 PRIMARY OSTEOARTHRITIS INVOLVING MULTIPLE JOINTS: ICD-10-CM

## 2025-03-26 DIAGNOSIS — M79.7 FIBROMYALGIA: ICD-10-CM

## 2025-03-26 DIAGNOSIS — M85.89 OSTEOPENIA OF MULTIPLE SITES: ICD-10-CM

## 2025-03-26 DIAGNOSIS — K58.1 IRRITABLE BOWEL SYNDROME WITH CONSTIPATION: ICD-10-CM

## 2025-03-26 DIAGNOSIS — G47.33 OSA (OBSTRUCTIVE SLEEP APNEA): ICD-10-CM

## 2025-03-26 PROBLEM — E66.01 CLASS 3 SEVERE OBESITY WITH SERIOUS COMORBIDITY AND BODY MASS INDEX (BMI) OF 40.0 TO 44.9 IN ADULT (HCC): Status: RESOLVED | Noted: 2023-12-28 | Resolved: 2025-03-26

## 2025-03-26 PROBLEM — E66.813 CLASS 3 SEVERE OBESITY WITH SERIOUS COMORBIDITY AND BODY MASS INDEX (BMI) OF 40.0 TO 44.9 IN ADULT (HCC): Status: RESOLVED | Noted: 2023-12-28 | Resolved: 2025-03-26

## 2025-03-26 PROBLEM — E66.813 CLASS 3 SEVERE OBESITY WITH SERIOUS COMORBIDITY AND BODY MASS INDEX (BMI) OF 40.0 TO 44.9 IN ADULT: Status: RESOLVED | Noted: 2023-12-28 | Resolved: 2025-03-26

## 2025-03-26 PROCEDURE — 99214 OFFICE O/P EST MOD 30 MIN: CPT | Performed by: INTERNAL MEDICINE

## 2025-03-26 RX ORDER — SPIRONOLACTONE 25 MG/1
25 TABLET ORAL DAILY
COMMUNITY
Start: 2025-01-14

## 2025-03-26 RX ORDER — ROSUVASTATIN CALCIUM 10 MG/1
10 TABLET, COATED ORAL NIGHTLY
COMMUNITY
Start: 2025-01-29

## 2025-03-26 NOTE — PROGRESS NOTES
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 L3 with respect L4 most likely degenerative in nature.  Vertebral body height maintained.  Multilevel disc space narrowing particularly at L2-3 and L3-4 where there is vacuum phenomenon and marginal osteophytes  is most consistent with degenerative change.  Mid to lower lumbar facet joint arthropathy.  PARASPINOUS:  No paraspinous abnormality is seen.  OTHER:  Multiple short coils project over the right pelvis most consistent with prior hernia repair.  Status post cholecystectomy.                   Impression   CONCLUSION:    1. Degenerative change as detailed above.        LOCATION:  [facility]        Dictated by (CST): Ev Bruno MD on 12/13/2023 at 11:22 AM      Finalized by (CST): Ev Bruno MD on 12/13/2023 at 11:25 AM       Result History  12/13/2023, 8:51 AM.     INDICATIONS:  Z96.652 History of left knee replacement M19.90 Osteoarthritis, unspecified osteoarthritis type, unspecified site R76.8 LINA positive     PATIENT STATED HISTORY: (As transcribed by Technologist)  Patient has all over joint pain.  Worst pain is lower back/pelvis area.         FINDINGS:      BONES:  Lateral projection demonstrates mild multilevel  subluxation most likely degenerative in nature.  Vertebral body height maintained.  Multilevel disc space narrowing with marginal osteophytes is most consistent with degenerative change.  Multilevel   facet joint arthropathy.                   Impression   CONCLUSION:    1. Degenerative change.        LOCATION:  [facility]        Dictated by (CST): Ev Bruno MD on 12/13/2023 at 11:25 A     Narrative   PROCEDURE:  MRI SPINE LUMBAR (CPT=72148)     COMPARISON:  None.     INDICATIONS:  R76.8 LINA positive M19.90 Osteoarthritis, unspecified osteoarthritis type, unspecified site     TECHNIQUE:  Multiplanar T1 and T2 weighted images including fat suppression sequences.  Images acquired in sagittal and axial planes.       PATIENT STATED HISTORY: (As transcribed by Technologist)  Patient states lower back pain.      FINDINGS:  Mild rightward curvature.  There is normal lumbar lordosis with anatomic alignment.  Vertebral body heights are well-maintained.  Moderate degenerative disc space loss, disc desiccation, endplate spurring, and degenerative endplate marrow signal changes  scattered in the lumbar spine.  Scattered vacuum disc phenomenon noted.  No focal worrisome marrow signal abnormality is seen.  Scattered areas of focal fat and/or hemangiomas noted in the visualized marrow space.     The distal spinal cord and conus medullaris have a normal signal and morphology.  The conus medullaris terminates at the lower L1 level.  The roots of the cauda equina are unremarkable.  No focal mass or fluid collection is seen in the lumbar spinal  canal.  The paraspinal soft tissues are unremarkable.     T12-L1:  Minimal diffuse disc bulge with endplate osteophytes.  Mild facet arthropathy. There is no significant spinal canal or neural foraminal stenosis.     L1-2:  Mild diffuse disc bulge with a small superimposed right paracentral disc protrusion.  Mild facet arthropathy. There is no significant spinal canal or neural foraminal  stenosis.     L2-3:  Diffuse disc bulge with a small superimposed left foraminal disc protrusion.  Mild facet arthropathy.  No significant spinal canal stenosis.  Mild bilateral neural foraminal stenosis.     L3-4:  Diffuse disc bulge with mild facet arthropathy. There is no significant spinal canal or neural foraminal stenosis.     L4-5:  Diffuse disc bulge with mild-to-moderate facet arthropathy, right greater than left. There is no significant spinal canal or neural foraminal stenosis.     L5-S1:  Minimal diffuse disc bulge with moderate facet arthropathy.  Small bilateral facet joint effusions. There is no significant spinal canal or neural foraminal stenosis.                   Impression   CONCLUSION:       1. Multilevel degenerative changes lumbar spine as above without significant spinal canal stenosis at any level.     2. Mild bilateral neural foraminal stenosis at L2-3.     3. Facet arthropathy throughout the lumbar spine, most pronounced at L5-S1.     Please see above for further details.     LOCATION:  [facility]         Result History    XR PELVIS (COMPLETE MIN 3 VIEWS) (CPT=72190) (Order #[id number]) on 12/13/2023 - Order Result History Report     FINDINGS:    Left total knee prosthesis in place.  No fracture or dislocation.       Assessment & Plan:      64-year-old woman comes in for further evaluation for polyarthralgias with incidental positive LINA 1-640    Positive LINA without clear history concerning for connective tissue disease  Fibromyalgia with multiple tender points nonrestorative sleep fatigue neuropathy; insomnia questionable IBS-C  Moderate osteoarthritis of the right knee  Lumbar cervical degenerative arthritis stenosis disc bulges seen by pain management  Osteoarthritis of the hips mild  Osteopenia with low fracture risk December 2023    With previous normal ESR CRP no signs or symptoms concerning for PMR  No overt weakness on exam  Recent autoimmune testing with specific serologies otherwise  normal other than positive LINA  Extensive autoimmune workup normal  CBC and CMP normal recently reviewed March 2025 and TSH  Would recommend physical therapy for strengthening and stretching  She has chronic trochanteric bursitis seen by Ortho as well along with her chronic shoulder issues and AC joint arthritis  She cannot tolerate NSAIDs was previously on meloxicam but had stomach issues and stopped  Continue gabapentin 300 mg at bedtime  Trazodone 100mg at bedtime and BuSpar through PCP  She is aware of diagnosis of fibromyalgia in detail written information given to the patient  Encouraged stress management better sleep and low intensity exercise  Risks and side effects of gabapentin discussed    She had seen pain management with no further intervention from a pain perspective she is doing well at this time    Reviewed x-rays MRI with patient she states if her pain worsens she will go back to pain management she does have 1 established at this time    Patient has a history of hysterectomy bone density reviewed from December 2023 showing osteopenia with low fracture at advised calcium at least 1200 mg if no contraindications vitamin D at least 3 to 4000 IU D3    Discussed with patient to follow-up with PCP and likely pain management in between visits for management of chronic pain with her pain specialist that we will refer her to who she has seen    Other options that could be used in the future through her PCP include Cymbalta amitriptyline nortriptyline Lyrica; increasing trazodone through them for better management of her insomnia or referral to psychiatrist      Education and counseling provided to patient.  Instructed patient to call my office or seek medical attention immediately if symptoms worsen. Risks and side effects of medications and diagnosis discussed in detail and patient was given written information on new prescribed medications.    Return to clinic:  Return in about 6 months (around  9/26/2025).    Dunia Shelton MD  12/6/2023

## 2025-03-26 NOTE — PATIENT INSTRUCTIONS
Fast Facts    Fibromyalgia affects two - four percent of people, women more often than men.    Fibromyalgia is not an autoimmune or inflammation based illness, but research suggests the nervous system is involved.    Doctors diagnose fibromyalgia based on all the patient's relevant symptoms (what you feel), no longer just on the number of tender places during an examination.    There is no test to detect this disease, but you may need lab tests or X-rays to rule out other health problems.    Though there is no cure, medications can reduce symptoms in some patients.    Patients also may feel better with proper self-care, such as exercise and getting enough sleep.    Fibromyalgia is a common neurologic health problem that causes widespread pain and tenderness (sensitivity to touch). The pain and tenderness tend to come and go, and move about the body. Most often, people with this chronic (long-term) illness are fatigued (very tired) and have sleep problems. The diagnosis can be made with a careful examination.    Fibromyalgia is most common in women, though it can occur in men. It most often starts in middle adulthood, but can occur in the teen years and in old age. You are at higher risk for fibromyalgia if you have a rheumatic disease (health problem that affects the joints, muscles and bones). These include osteoarthritis, lupus, rheumatoid arthritis, or ankylosing spondylitis.    What is fibromyalgia?    What causes fibromyalgia?    The causes of fibromyalgia are unclear. They may be different in different people. Current research suggests involvement of the nervous system, particularly the central nervous system (brain and spinal cord). Fibromyalgia is not from an autoimmune, inflammation, joint, or muscle disorder. Fibromyalgia may run in families. There likely are certain genes that can make people more prone to getting fibromyalgia and the other health problems that can occur with it. Genes alone, though, do  not cause fibromyalgia.    There is most often some triggering factor that sets off fibromyalgia. It may be spine problems, arthritis, injury, or other type of physical stress. Emotional stress also may trigger this illness. The result is a change in the way the body \"talks\" with the spinal cord and brain. Levels of brain chemicals and proteins may change. More recently, Fibromyalgia has been described as Central Pain Amplification disorder, meaning the volume of pain sensation in the brain is turned up too high.    Although Fibromyalgia can affect quality of life, it is still considered medically benign. It does not cause any heart attacks, stroke, cancer, physical deformities, or loss of life.         How is fibromyalgia diagnosed?    A doctor will suspect fibromyalgia based on your symptoms. Doctors may require that you have tenderness to pressure or tender points at a specific number of certain spots before saying you have fibromyalgia, but they are not required to make the diagnosis (see the Box). A physical exam can be helpful to detect tenderness and to exclude other causes of muscle pain. There are no diagnostic tests (such as X-rays or blood tests) for this problem. Yet, you may need tests to rule out another health problem that can be confused with fibromyalgia.    Because widespread body pain is the main feature of fibromyalgia, health care providers will ask you to describe your pain. This may help tell the difference between fibromyalgia and other diseases with similar symptoms. Other conditions such as hypothyroidism (underactive thyroid gland) and polymyalgia rheumatica sometimes mimic fibromyalgia. Blood tests can tell if you have either of these problems. Sometimes, fibromyalgia is confused with rheumatoid arthritis or lupus. But, again, there is a difference in the symptoms, physical findings and blood tests that will help your health care provider detect these health problems. Unlike fibromyalgia,  these rheumatic diseases cause inflammation in the joints and tissues.    Criteria Needed for a Fibromyalgia Diagnosis      1. Pain and symptoms over the past week, based on the total of number of painful areas out of 19 parts of the body plus level of severity of these symptoms:    a. Fatigue    b. Waking unrefreshed    c. Cognitive (memory or thought) problems    Plus number of other general physical symptoms    2. Symptoms lasting at least three months at a similar level    3. No other health problem that would explain the pain and other symptoms    How is fibromyalgia treated?    There is no cure for fibromyalgia. However, symptoms can be treated with both non-drug and medication based treatments. Many times the best outcomes are achieved by using multiple types of treatments.    Non-Drug Therapies: People with fibromyalgia should use non-drug treatments as well as any medicines their doctors suggest. Research shows that the most effective treatment for fibromyalgia is physical exercise. Physical exercise should be used in addition to any drug treatment. Patients benefit most from regular aerobic exercises. Other body-based therapies, including [employer] and yoga, can ease fibromyalgia symptoms. Although you may be in pain, low impact physical exercise will not be harmful.    Cognitive behavioral therapy is a type of therapy focused on understanding how thoughts and behaviors affect pain and other symptoms. CBT and related treatments, such as mindfulness, can help patients learn symptom reduction skills that lessen pain. Mindfulness is a non-spiritual meditation practice that cultivates present moment awareness. Mindfulness based stress reduction has been shown to significantly improve symptoms of fibromyalgia.    Other complementary and alternative therapies (sometimes called CAM or integrative medicine), such as acupuncture, chiropractic and massage therapy, can be useful to manage fibromyalgia symptoms. Many of  these treatments, though, have not been well tested in patients with fibromyalgia.    It is important to address risk factors and triggers for fibromyalgia including sleep disorders, such as sleep apnea, and mood problems such as stress, anxiety, panic disorder, and depression. This may require involvement of other specialists such as a Sleep Medicine doctor, Psychiatrist, and therapist.     Medications: The [place] Food and Drug Administration has approved three drugs for the treatment of fibromyalgia. They include two drugs that change some of the brain chemicals (serotonin and norepinephrine) that help control pain levels: duloxetine (Cymbalta) and milnacipran (Savella). Older drugs that affect these same brain chemicals also may be used to treat fibromyalgia. These include amitriptyline (Elavil) and cyclobenzaprine (Flexeril). Other antidepressant drugs can be helpful in some patients. Side effects vary by the drug. Ask your doctor about the risks and benefits of your medicine.    The other drug approved for fibromyalgia is pregabalin (Lyrica). Pregabalin and another drug, gabapentin (Neurontin), work by blocking the over activity of nerve cells involved in pain transmission. These medicines may cause dizziness, sleepiness, swelling and weight gain.    It is strongly recommended to avoid opioid narcotic medications for treating fibromyalgia. The reason for this is that research evidence shows these drugs are not of helpful to most people with fibromyalgia, and will cause greater pain sensitivity or make pain persist. Tramadol (Ultram) may be used to treat fibromyalgia pain if short-term use of an opioid narcotic is needed. Over-the-counter medicines such as acetaminophen (Tylenol) or nonsteroidal anti-inflammatory drugs (commonly called NSAIDs) like ibuprofen (Advil, Motrin) or naproxen (Aleve, Anaprox) are not effective for fibromyalgia pain. Yet, these drugs may be useful to treat the pain triggers of  fibromyalgia. Thus, they are most useful in people who have other causes for pain such as arthritis in addition to fibromyalgia.    For sleep problems, some of the medicines that treat pain also improve sleep. These include cyclobenzaprine (Flexeril), amitriptyline (Elavil), gabapentin (Neurontin) or pregabalin (Lyrica). It is not recommended that patients with fibromyalgia take sleeping medicines like zolpidem (Ambien) or benzodiazepine medications.

## 2025-06-04 ENCOUNTER — HOSPITAL ENCOUNTER (OUTPATIENT)
Dept: CT IMAGING | Age: 65
Discharge: HOME | End: 2025-06-04
Attending: INTERNAL MEDICINE
Payer: MEDICARE

## 2025-06-04 DIAGNOSIS — Z87.898 HISTORY OF ABDOMINAL ABSCESS: ICD-10-CM

## 2025-06-04 DIAGNOSIS — R10.31 RIGHT LOWER QUADRANT ABDOMINAL PAIN: ICD-10-CM

## 2025-06-04 LAB
CREAT BLD-MCNC: 1.3 MG/DL (ref 0.55–1.02)
EGFRCR SERPLBLD CKD-EPI 2021: 46 ML/MIN/1.73M2 (ref 60–?)

## 2025-06-04 PROCEDURE — 74177 CT ABD & PELVIS W/CONTRAST: CPT | Performed by: INTERNAL MEDICINE

## 2025-06-04 PROCEDURE — 82565 ASSAY OF CREATININE: CPT

## 2025-07-24 RX ORDER — SEMAGLUTIDE 1.34 MG/ML
0.25 INJECTION, SOLUTION SUBCUTANEOUS WEEKLY
COMMUNITY
Start: 2025-04-07 | End: 2026-04-07

## 2025-07-30 ENCOUNTER — OFFICE VISIT (OUTPATIENT)
Dept: UROLOGY | Facility: CLINIC | Age: 65
End: 2025-07-30
Attending: OBSTETRICS & GYNECOLOGY
Payer: MEDICARE

## 2025-07-30 VITALS
HEIGHT: 64 IN | TEMPERATURE: 98 F | SYSTOLIC BLOOD PRESSURE: 120 MMHG | BODY MASS INDEX: 40.46 KG/M2 | WEIGHT: 237 LBS | DIASTOLIC BLOOD PRESSURE: 60 MMHG

## 2025-07-30 DIAGNOSIS — M62.89 PELVIC FLOOR TENSION: ICD-10-CM

## 2025-07-30 DIAGNOSIS — N95.2 VAGINAL ATROPHY: ICD-10-CM

## 2025-07-30 DIAGNOSIS — N39.41 URGE INCONTINENCE: Primary | ICD-10-CM

## 2025-07-30 DIAGNOSIS — R35.1 NOCTURIA: ICD-10-CM

## 2025-07-30 DIAGNOSIS — N94.10 DYSPAREUNIA IN FEMALE: ICD-10-CM

## 2025-07-30 DIAGNOSIS — N39.3 SUI (STRESS URINARY INCONTINENCE, FEMALE): ICD-10-CM

## 2025-07-30 LAB
BLOOD URINE: NEGATIVE
CONTROL RUN WITHIN 24 HOURS?: YES
LEUKOCYTE ESTERASE URINE: NEGATIVE
NITRITE URINE: NEGATIVE

## 2025-07-30 PROCEDURE — 87086 URINE CULTURE/COLONY COUNT: CPT | Performed by: OBSTETRICS & GYNECOLOGY

## 2025-07-30 PROCEDURE — 81002 URINALYSIS NONAUTO W/O SCOPE: CPT | Performed by: OBSTETRICS & GYNECOLOGY

## 2025-07-30 PROCEDURE — 99212 OFFICE O/P EST SF 10 MIN: CPT

## 2025-07-30 PROCEDURE — 51701 INSERT BLADDER CATHETER: CPT | Performed by: OBSTETRICS & GYNECOLOGY

## 2025-07-30 RX ORDER — ESTRADIOL 0.1 MG/G
CREAM VAGINAL
Qty: 42.5 G | Refills: 3 | Status: SHIPPED | OUTPATIENT
Start: 2025-07-30

## (undated) DEVICE — SOL  .9 1000ML BTL

## (undated) DEVICE — WRAP COOLING KNEE W/ICE PILLOW

## (undated) DEVICE — Device

## (undated) DEVICE — SUTURE STRATAFIX PDS PLUS 45CM

## (undated) DEVICE — DRAPE,U/SHT,SPLIT,FILM,60X84,STERILE: Brand: MEDLINE

## (undated) DEVICE — PADDING CAST COTTON  4

## (undated) DEVICE — BOWL CEMENT MIX QUICK-VAC

## (undated) DEVICE — BANDAGE ELASTIC ACE 6\" X-LONG

## (undated) DEVICE — GOWN SURG AERO CHROME XXL

## (undated) DEVICE — CHLORAPREP 26ML APPLICATOR

## (undated) DEVICE — ZIMMER® STERILE DISPOSABLE TOURNIQUET CUFF WITH PLC, DUAL PORT, SINGLE BLADDER, 34 IN. (86 CM)

## (undated) DEVICE — UNIVERSAL STERIBUMP® STERILE (5/CASE): Brand: UNIVERSAL STERIBUMP®

## (undated) DEVICE — Device: Brand: STABLECUT®

## (undated) DEVICE — CONVERTORS STOCKINETTE: Brand: CONVERTORS

## (undated) DEVICE — HOOD, PEEL-AWAY: Brand: FLYTE

## (undated) DEVICE — DRESSING AQUACEL AG 3.5X12

## (undated) DEVICE — STERILE POLYISOPRENE POWDER-FREE SURGICAL GLOVES: Brand: PROTEXIS

## (undated) DEVICE — SPECIMEN CONTAINER,POSITIVE SEAL INDICATOR, OR PACKAGED: Brand: PRECISION

## (undated) DEVICE — 3M™ IOBAN™ 2 ANTIMICROBIAL INCISE DRAPE 6651EZ: Brand: IOBAN™ 2

## (undated) DEVICE — KENDALL SCD EXPRESS SLEEVES, KNEE LENGTH, MEDIUM: Brand: KENDALL SCD

## (undated) DEVICE — Device: Brand: PLUMEPEN

## (undated) DEVICE — SUTURE VICRYL 2-0 CP-1

## (undated) DEVICE — DECANTER BAG 9": Brand: MEDLINE INDUSTRIES, INC.

## (undated) DEVICE — TOTAL KNEE CDS: Brand: MEDLINE INDUSTRIES, INC.

## (undated) DEVICE — 3M™ MICROFOAM™ TAPE 1528-4: Brand: 3M™ MICROFOAM™

## (undated) DEVICE — ATTUNE PINNING SYSTEM
Type: IMPLANTABLE DEVICE | Site: KNEE
Brand: ATTUNE

## (undated) NOTE — LETTER
[facility]  Phone: [phone]  Right Fax: [phone]  [facility] By:  Blaire Jacobson RN Date: 8/16/19    Patient Name: Ky Darrion  Surgery Date: 8/28/2019    CSN: [id number]  Medical Record: [id number]

## (undated) NOTE — LETTER
[facility]  Phone: [phone]  OUTSIDE TESTING RESULT REQUEST      TO:   DR Mirta Hale     Today's Date: 7/31/19    FAX #: [phone]     IMPORTANT: FOR YOUR IMMEDIATE ATTENTION  Please FAX all test results listed below to:

## (undated) NOTE — LETTER
[facility]  Phone: [phone]  OUTSIDE TESTING RESULT REQUEST      TO:   DR Candice Calderon     Today's Date: 7/31/19    FAX #: [phone]     IMPORTANT: FOR YOUR IMMEDIATE ATTENTION  Please FAX all test results listed below to:

## (undated) NOTE — IP AVS SNAPSHOT
Patient Demographics     Address  [address] [place] [id number] Phone  [phone] [facility])  [phone] (Work)  [phone] (Mobile) *Preferred* E-mail Address  [email or web address]      Emergency Contact(s)     Name Relation Home Work [employer] dry if necessary WITH A CLEAN TOWEL and cover incision with dry sterile gauze and paper tape. For other types of dressings, follow surgeon’s orders. GAUZE              Driving  ? Do not drive until cleared by surgeon.  This is [facility] Anticoagulants = blood thinners (Xarelto, Eliquis, Lovenox, Coumadin, or Aspirin)  ? Pill or shot form depending on what your physician orders. ? IF placed on Coumadin, you may also need lab work done for monitoring. ?  You will bleed easier and bruise e ? Have realistic goals and keep a positive outlook. ? Deep breathing and relaxation techniques and distractions can help! If you focus on something else, you do not experience the pain the same.  Take advantage of everything available to your to help cont surgery and that stress can affect all your other health issues (such as high blood pressure, diabetes, CHF, afib, and asthma just to name a few).   We don’t want those other health issues to cause you to get readmitted to the hospital; much better for you ? TEMPORARY HANDICAP PARKING APPLICATION  (good for 3-6 months)  – At Surgeon or PCP visit, request they fill out the form, then go to [facility] (only time you do not wait in a long line there). Some [facility] offices provide the same service.  (Sabina Thorne infection after the bandage is removed. For hip replacements, a daily gauze dressing with paper tape should be used to keep the incision dry.  For knee replacements, the incision must remain covered and dry until seen in clinic for the first post-operative to receive any refills from this physician. It is dangerous and confusing to have multiple physicians prescribing narcotics. The Norco can be broken in half if the dose you are taking is too high.    Swelling is a common complaint after having a joint repla Bedside Exercises  Please perform 10-repetitions of each exercise per hour while awake. ? Ankle Pumps- Slowly push your foot up and down like you’re pressing on a gas pedal and pulling upwards.   You can do this for a set amount such as 20 pumps, or you ca For hips, extension with external rotation is a life-long hip dislocation precaution. Also, avoiding straight leg raises while lying down for the first 6 weeks after surgery to avoid tendonitis.  For knees, there are no restrictions for your activity levels regarding these, contact the physician that prescribed them. 13. When can I take a bath/swim in a pool? NO soaking in a bath, or pool, is allowed for 6-8 weeks after surgery, or until you are completely healed with no scabs.    14. When can I stop wearin ? The 2nd piece (shorter compression sleeve) is pulled over and past the first sleeve onto the knee. Fold it back on itself also creating a double layer. The lower edge of the knee portion should overlap the top of the calf tubigrip by a few inches.  This MULTIVITAMIN OR      Take 1 tablet by mouth daily. mupirocin 2 % Oint  Commonly known as:  BACTROBAN      Use cotton swab to place a pea size amount of ointment. Apply topically to inside your nose twice daily.    Jessica Loya MD         Om [id number] acetaminophen (TYLENOL) tab 650 mg 08/29/19 1717 Given      [id number] acetaminophen (TYLENOL) tab 650 mg 08/29/19 2142 Given      [id number] acetaminophen (TYLENOL) tab 650 mg 08/30/19 0823 Given      [id number] apixaban (ELIQUIS) tab 2.5 mg 08/29 Blood — 08/30/19 2132          Components    Component Value Reference Range [facility]   WBC 7.4 4.0 - 11.0 x10(3) uL — [facility]   RBC 3.79 3.80 - 5.30 x10(6)uL  [facility]   HGB 11.2 12.0 - 16.0 g/dL  [facility]   HCT 33.6 35.0 - 48.0 %  [facility]   PLT LISINOPRIL 20 MG Oral Tab TAKE 1 TABLET BY MOUTH DAILY Disp: 90 tablet Rfl: 0   HYDROCHLOROTHIAZIDE 25 MG Oral Tab TAKE 1 TABLET BY MOUTH DAILY Disp: 90 tablet Rfl: 0   ATORVASTATIN 20 MG Oral Tab TAKE 1 TABLET BY MOUTH EVERY NIGHT Disp: 90 tablet Rfl: 1 • COLONOSCOPY,POSSIBLE BIOPSY,POSSIBLE POLYPECTOMY N/A 10/19/2017     Performed by Kaleigh Glynn MD at [facility] age 21?    • HERNIA SURGERY         3 right inguinal   • HYSTERECTOMY   2003     for fibroids   • INCISION CARDIOVASCULAR: S1, S2 normal, RRR; no S3, no S4; no click; no murmur, no rub; normal PMI   ABDOMEN: normal active BS+, soft, nondistended; no HSM; no masses; no bruits; nontender  MUSCULOSKELETAL: no acute synovitis upper or lower extremity  BACK: nontend Patient is acceptable risk if blood tests acceptable and cleared by her cardiologist. This consult was sent back the referring physician, Dr. Matthieu Rivero. 8/5/19  Orders Placed This Encounter      CBC w Differential w Platelet [[id number]]      Complete Metabolic Pane AK.2 - Peace Miller MD on 8/28/2019  9:07 AM               H&P signed by Peace Miller MD at 8/28/2019  9:06 AM  Version 1 of 2    Author:  Peace Miller MD Service:  Orthopedics Author Type:  Physician    Filed:  8/28/2019  9:06 AM Date of Service:  8/28/20 Multiple Vitamins-Minerals (MULTIVITAMIN OR) Take  by mouth. Disp:  Rfl:        Allergies:   Levaquin [Levofloxa*    OTHER (SEE COMMENTS)    Comment:agitation        Past Medical History:   Diagnosis Date   • Abnormal Pap smear of cervix       21 yrs old? Patient denies chest pain with walking exercise.   Patient started low-carb and keto diet, she is lost weight this summer      REVIEW OF SYSTEMS:   GENERAL: feels well otherwise  SKIN: denies any unusual skin lesions  EYES:denies blurred vision or double vi Hold aspirin/nsaids for 7-10 day before surgery.   Discussed risks including pain, infection, bleeding, scar, blood clots, heart attack, respiratory arrest, stroke and anesthesia problems      Pt has the following conditions:      Essential hypertension  ( Wt 213 lb (96.6 kg)    BMI 36.56 kg/m²    BSA 2.01 m²    Flowsheets:    DMG TEMP FOR BP BPA COMPARE,    Energy Needs       Encounter Info:    Billing Info,    History,    Allergies,    Detailed Report       Communications         Chart Routed to [facility] * No active hospital problems. *[BR.3]      Past Medical History[BR.1]   Past Medical History:   Diagnosis Date   • Abnormal Pap smear of cervix     21 yrs old?    • Allergic rhinitis    • Anxiety state    • CAD (coronary artery disease)    • Coronary ath Management Techniques: Activity promotion; Body mechanics;Breathing techniques;Relaxation;Repositioning[BR.3]    BALANCE[BR.1]  Static Sitting: Good  Dynamic Sitting: Good  Static Standing: Fair  Dynamic Standing: Fair -[BR.3]    ACTIVITY TOLERANCE shows improved tolerance to increased repetitions of exercises with some assist and cues for technique. Pt performed gait training, ambulated with  feet with sup assist. Pt needs cues to correct proper gait pattern during mobility.   Pt was trained on    Patient is able to demonstrate transfers Sit to/from Stand at assistance level: supervison  Met     Goal #3     Patient is able to ambulate 150 feet with assistive device at assistance level: modified  independent    Goal #4     Patient will negotiate 4 • Osteoarthritis    • Osteopenia 2019   • PONV (postoperative nausea and vomiting)     VERTIGO ISSUES   • Prediabetes    • Unspecified essential hypertension    • Uterine fibroid    • Visual impairment     GLASSES[NP.2]       Past Surgical History[NP.1]  P L Lower Extremity: Weight Bearing as Tolerated[NP.2]    PAIN ASSESSMENT[NP.1]  Ratin  Location: Left Knee @ surgical site  Management Techniques: Activity promotion; Body mechanics;Breathing techniques;Relaxation;Repositioning[NP.2]    COGNITION  · Over FUNCTIONAL ABILITY STATUS  Gait Assessment[NP.1]   Gait Assistance: Dependent assistance(Actual assist - min)  Distance (ft): 25 ft  Assistive Device: Rolling walker  Pattern: L Decreased stance time  Stoop/Curb Assistance: Not tested  Comment : Above scor this evaluation, patient's clinical presentation is stable and overall the evaluation complexity is considered low.   These impairments and comorbidities manifest themselves as functional limitations in independent bed mobility, transfers, and gait skills & through 8/30/2019 11:54 AM)      Occupational Therapy Note signed by Piotr Russo OT at 8/29/2019 11:45 AM  Version 1 of 1    Author:  Piotr Russo OT Service:  Rehab Author Type:  Occupational Therapist    Filed:  8/29/2019 11:45 AM Date of Se • COLONOSCOPY,POSSIBLE BIOPSY,POSSIBLE POLYPECTOMY N/A 10/19/2017    Performed by Joe Vinson MD at [address]      age 21?    • HERNIA SURGERY      3 right inguinal   • HYSTERECTOMY  2003    for fibroids   • INCISION OF HYMEN Patient Position: Lying    O2 SATURATIONS  SPO2 on Room Air at Rest: 96             ACTIVITIES OF DAILY LIVING ASSESSMENT  AM-PAC ‘6-Clicks’ Inpatient Daily Activity Short Form  How much help from another person does the patient currently need…  -   Glenntin place; Ice applied    ASSESSMENT     Patient is a 60year old female admitted on 8/28/2019 for elective L TKA. Complete medical history and occupational profile noted above. Functional outcome measures completed include AMPAC.   In this OT evaluation patient Patient will perform lower body dressing with supervision  Patient will perform toileting with supervision    FUNCTIONAL TRANSFER GOALS   Patient will perform supine to sit with supervision  Patient will perform sit to supine with supervision  Patient will

## (undated) NOTE — Clinical Note
Thank you for referring Sara Ignacio to the [address] [place] [address] [facility]. Consult was completed today via person. I have referred for a nutrition consultation with our dietician, and counselor. I counseled on the importance of lifestyle intervention in adjunct with medication. She prefers lifestyle intervention to start for treatment with follow-up advised in about 3 months.

## (undated) NOTE — LETTER
March 7, 2024         Hailey Burk DO  [address] [address]      Patient: Reva Thomas   YOB: 1960   Date of Visit: 3/7/2024       Dear Dr. Wm DO,    I saw your patient, Reva Thomas, on 3/7/2024. Enclosed is my consultation / progress note from that encounter. Thank you for allowing me to participate in the care of this patient.    Sincerely,                           Dunia Shelton MD  [address], [address] [place] [phone]    Document electronically generated by:  Dunia Shelton MD on 3/7/2024    CC: No Recipients    Enclosure

## (undated) NOTE — LETTER
Patient Name: Reva Thomas        : 11/10/1960       Medical Record #: [id number]    CONSENT FOR PROCEDURES/SEDATION    Date: 10/22/2024       Time: 7:53 AM        1. I authorize the performance upon Reva Thomas the following:    ___________________image guided liver biopsy _________________________________    2. I authorize Dr. Stiles (and whomever is designated as the doctor’s assistant), to perform the above mentioned procedures.    3. If any unforeseen conditions arise during this procedure calling for additional procedures, operations, or medications (including anesthesia and blood transfusion), I  further request and authorize the doctor to do whatever he/she deems advisable in my interest.    4. I consent to the taking and reproduction of any photographs in the course of this procedure for professional purposes.    5. I consent to the administration of such sedation as may be considered necessary or advisable by the physician responsible for this service, with the exception of  _____________________________.    6. I have been informed by my doctor of the nature and purpose of this procedure/sedation, possible alternative methods of treatment, risk involved and possible complications.      Signature of Patient:  ___________________________    Signature of person authorized to consent for patient: Relationship to patient:  ___________________________    ___________________    Witness: ____________________     Date: ______________    Provider: ____________________     Date: ______________